# Patient Record
Sex: MALE | Race: WHITE | URBAN - METROPOLITAN AREA
[De-identification: names, ages, dates, MRNs, and addresses within clinical notes are randomized per-mention and may not be internally consistent; named-entity substitution may affect disease eponyms.]

---

## 2017-08-23 ENCOUNTER — APPOINTMENT (OUTPATIENT)
Dept: CT IMAGING | Facility: CLINIC | Age: 57
End: 2017-08-23
Attending: EMERGENCY MEDICINE
Payer: COMMERCIAL

## 2017-08-23 ENCOUNTER — HOSPITAL ENCOUNTER (INPATIENT)
Facility: CLINIC | Age: 57
LOS: 2 days | Discharge: HOME | End: 2017-08-25
Attending: EMERGENCY MEDICINE | Admitting: INTERNAL MEDICINE
Payer: COMMERCIAL

## 2017-08-23 DIAGNOSIS — R57.1 HYPOVOLEMIC SHOCK (H): ICD-10-CM

## 2017-08-23 DIAGNOSIS — N17.9 ACUTE KIDNEY INJURY (H): ICD-10-CM

## 2017-08-23 DIAGNOSIS — R19.7 DIARRHEA, UNSPECIFIED TYPE: ICD-10-CM

## 2017-08-23 LAB
ALBUMIN SERPL-MCNC: 3.8 G/DL (ref 3.4–5)
ALBUMIN UR-MCNC: 100 MG/DL
ALP SERPL-CCNC: 98 U/L (ref 40–150)
ALT SERPL W P-5'-P-CCNC: 26 U/L (ref 0–70)
ANION GAP SERPL CALCULATED.3IONS-SCNC: 13 MMOL/L (ref 3–14)
ANION GAP SERPL CALCULATED.3IONS-SCNC: 15 MMOL/L (ref 6–17)
APPEARANCE UR: ABNORMAL
AST SERPL W P-5'-P-CCNC: 12 U/L (ref 0–45)
BACTERIA #/AREA URNS HPF: ABNORMAL /HPF
BASE DEFICIT BLDV-SCNC: 7.4 MMOL/L
BASOPHILS # BLD AUTO: 0 10E9/L (ref 0–0.2)
BASOPHILS NFR BLD AUTO: 0.2 %
BILIRUB SERPL-MCNC: 0.5 MG/DL (ref 0.2–1.3)
BILIRUB UR QL STRIP: NEGATIVE
BUN SERPL-MCNC: 49 MG/DL (ref 7–30)
BUN SERPL-MCNC: 49 MG/DL (ref 7–30)
CA-I BLD-SCNC: 4.5 MG/DL (ref 4.4–5.2)
CALCIUM SERPL-MCNC: 9 MG/DL (ref 8.5–10.1)
CHLORIDE BLD-SCNC: 106 MMOL/L (ref 94–109)
CHLORIDE SERPL-SCNC: 105 MMOL/L (ref 94–109)
CK SERPL-CCNC: 420 U/L (ref 30–300)
CK SERPL-CCNC: 629 U/L (ref 30–300)
CO2 BLD-SCNC: 17 MMOL/L (ref 20–32)
CO2 SERPL-SCNC: 19 MMOL/L (ref 20–32)
COLOR UR AUTO: ABNORMAL
CREAT BLD-MCNC: 4.9 MG/DL (ref 0.66–1.25)
CREAT SERPL-MCNC: 4.77 MG/DL (ref 0.66–1.25)
DIFFERENTIAL METHOD BLD: ABNORMAL
EOSINOPHIL # BLD AUTO: 0 10E9/L (ref 0–0.7)
EOSINOPHIL NFR BLD AUTO: 0 %
ERYTHROCYTE [DISTWIDTH] IN BLOOD BY AUTOMATED COUNT: 14.1 % (ref 10–15)
GFR SERPL CREATININE-BSD FRML MDRD: 12 ML/MIN/1.7M2
GFR SERPL CREATININE-BSD FRML MDRD: 13 ML/MIN/1.7M2
GLUCOSE BLD-MCNC: 188 MG/DL (ref 70–99)
GLUCOSE SERPL-MCNC: 188 MG/DL (ref 70–99)
GLUCOSE UR STRIP-MCNC: 50 MG/DL
HCO3 BLDV-SCNC: 18 MMOL/L (ref 21–28)
HCT VFR BLD AUTO: 51.4 % (ref 40–53)
HCT VFR BLD CALC: 57 %PCV (ref 40–53)
HGB BLD CALC-MCNC: 19.4 G/DL (ref 13.3–17.7)
HGB BLD-MCNC: 16.3 G/DL (ref 13.3–17.7)
HGB UR QL STRIP: ABNORMAL
IMM GRANULOCYTES # BLD: 0.1 10E9/L (ref 0–0.4)
IMM GRANULOCYTES NFR BLD: 0.5 %
KETONES UR STRIP-MCNC: NEGATIVE MG/DL
LACTATE BLD-SCNC: 2.3 MMOL/L (ref 0.7–2)
LEUKOCYTE ESTERASE UR QL STRIP: NEGATIVE
LYMPHOCYTES # BLD AUTO: 0.9 10E9/L (ref 0.8–5.3)
LYMPHOCYTES NFR BLD AUTO: 5.9 %
MAGNESIUM SERPL-MCNC: 2 MG/DL (ref 1.6–2.3)
MCH RBC QN AUTO: 28.2 PG (ref 26.5–33)
MCHC RBC AUTO-ENTMCNC: 31.7 G/DL (ref 31.5–36.5)
MCV RBC AUTO: 89 FL (ref 78–100)
MONOCYTES # BLD AUTO: 1.4 10E9/L (ref 0–1.3)
MONOCYTES NFR BLD AUTO: 9.3 %
MUCOUS THREADS #/AREA URNS LPF: PRESENT /LPF
NEUTROPHILS # BLD AUTO: 12.5 10E9/L (ref 1.6–8.3)
NEUTROPHILS NFR BLD AUTO: 84.1 %
NITRATE UR QL: NEGATIVE
NRBC # BLD AUTO: 0 10*3/UL
NRBC BLD AUTO-RTO: 0 /100
O2/TOTAL GAS SETTING VFR VENT: ABNORMAL %
OXYHGB MFR BLDV: 37 %
PCO2 BLDV: 37 MM HG (ref 40–50)
PH BLDV: 7.3 PH (ref 7.32–7.43)
PH UR STRIP: 5 PH (ref 5–7)
PLATELET # BLD AUTO: 297 10E9/L (ref 150–450)
PLATELET # BLD AUTO: 394 10E9/L (ref 150–450)
PO2 BLDV: 21 MM HG (ref 25–47)
POTASSIUM BLD-SCNC: 4.3 MMOL/L (ref 3.4–5.3)
POTASSIUM SERPL-SCNC: 4.4 MMOL/L (ref 3.4–5.3)
PROT SERPL-MCNC: 8.5 G/DL (ref 6.8–8.8)
RBC # BLD AUTO: 5.78 10E12/L (ref 4.4–5.9)
RBC #/AREA URNS AUTO: 2 /HPF (ref 0–2)
SODIUM BLD-SCNC: 138 MMOL/L (ref 133–144)
SODIUM SERPL-SCNC: 137 MMOL/L (ref 133–144)
SOURCE: ABNORMAL
SP GR UR STRIP: 1.02 (ref 1–1.03)
SQUAMOUS #/AREA URNS AUTO: 2 /HPF (ref 0–1)
TROPONIN I SERPL-MCNC: <0.015 UG/L (ref 0–0.04)
UROBILINOGEN UR STRIP-MCNC: 0 MG/DL (ref 0–2)
WBC # BLD AUTO: 14.8 10E9/L (ref 4–11)
WBC #/AREA URNS AUTO: 8 /HPF (ref 0–2)

## 2017-08-23 PROCEDURE — 99285 EMERGENCY DEPT VISIT HI MDM: CPT | Mod: 25

## 2017-08-23 PROCEDURE — 85025 COMPLETE CBC W/AUTO DIFF WBC: CPT | Performed by: EMERGENCY MEDICINE

## 2017-08-23 PROCEDURE — 84484 ASSAY OF TROPONIN QUANT: CPT | Performed by: EMERGENCY MEDICINE

## 2017-08-23 PROCEDURE — 74176 CT ABD & PELVIS W/O CONTRAST: CPT

## 2017-08-23 PROCEDURE — 25000128 H RX IP 250 OP 636: Performed by: EMERGENCY MEDICINE

## 2017-08-23 PROCEDURE — 83605 ASSAY OF LACTIC ACID: CPT | Performed by: EMERGENCY MEDICINE

## 2017-08-23 PROCEDURE — 93005 ELECTROCARDIOGRAM TRACING: CPT

## 2017-08-23 PROCEDURE — 80047 BASIC METABLC PNL IONIZED CA: CPT

## 2017-08-23 PROCEDURE — 83735 ASSAY OF MAGNESIUM: CPT | Performed by: EMERGENCY MEDICINE

## 2017-08-23 PROCEDURE — 81001 URINALYSIS AUTO W/SCOPE: CPT | Performed by: EMERGENCY MEDICINE

## 2017-08-23 PROCEDURE — 82550 ASSAY OF CK (CPK): CPT | Performed by: EMERGENCY MEDICINE

## 2017-08-23 PROCEDURE — 80053 COMPREHEN METABOLIC PANEL: CPT | Performed by: EMERGENCY MEDICINE

## 2017-08-23 PROCEDURE — 87506 IADNA-DNA/RNA PROBE TQ 6-11: CPT | Performed by: EMERGENCY MEDICINE

## 2017-08-23 PROCEDURE — 85014 HEMATOCRIT: CPT

## 2017-08-23 PROCEDURE — 99223 1ST HOSP IP/OBS HIGH 75: CPT | Mod: AI | Performed by: INTERNAL MEDICINE

## 2017-08-23 PROCEDURE — 82550 ASSAY OF CK (CPK): CPT | Performed by: INTERNAL MEDICINE

## 2017-08-23 PROCEDURE — 25000128 H RX IP 250 OP 636: Performed by: INTERNAL MEDICINE

## 2017-08-23 PROCEDURE — 36415 COLL VENOUS BLD VENIPUNCTURE: CPT | Performed by: INTERNAL MEDICINE

## 2017-08-23 PROCEDURE — 87493 C DIFF AMPLIFIED PROBE: CPT | Performed by: EMERGENCY MEDICINE

## 2017-08-23 PROCEDURE — 85049 AUTOMATED PLATELET COUNT: CPT | Performed by: INTERNAL MEDICINE

## 2017-08-23 PROCEDURE — 12000000 ZZH R&B MED SURG/OB

## 2017-08-23 PROCEDURE — 82805 BLOOD GASES W/O2 SATURATION: CPT | Performed by: EMERGENCY MEDICINE

## 2017-08-23 RX ORDER — SODIUM CHLORIDE 9 MG/ML
INJECTION, SOLUTION INTRAVENOUS CONTINUOUS
Status: DISCONTINUED | OUTPATIENT
Start: 2017-08-23 | End: 2017-08-25

## 2017-08-23 RX ORDER — HEPARIN SODIUM 5000 [USP'U]/.5ML
5000 INJECTION, SOLUTION INTRAVENOUS; SUBCUTANEOUS EVERY 12 HOURS
Status: DISCONTINUED | OUTPATIENT
Start: 2017-08-23 | End: 2017-08-25 | Stop reason: HOSPADM

## 2017-08-23 RX ORDER — BISACODYL 10 MG
10 SUPPOSITORY, RECTAL RECTAL DAILY PRN
Status: DISCONTINUED | OUTPATIENT
Start: 2017-08-23 | End: 2017-08-23

## 2017-08-23 RX ORDER — PROCHLORPERAZINE 25 MG
25 SUPPOSITORY, RECTAL RECTAL EVERY 12 HOURS PRN
Status: DISCONTINUED | OUTPATIENT
Start: 2017-08-23 | End: 2017-08-25 | Stop reason: HOSPADM

## 2017-08-23 RX ORDER — SODIUM CHLORIDE, SODIUM LACTATE, POTASSIUM CHLORIDE, CALCIUM CHLORIDE 600; 310; 30; 20 MG/100ML; MG/100ML; MG/100ML; MG/100ML
INJECTION, SOLUTION INTRAVENOUS ONCE
Status: DISCONTINUED | OUTPATIENT
Start: 2017-08-23 | End: 2017-08-23

## 2017-08-23 RX ORDER — NICOTINE POLACRILEX 4 MG
15-30 LOZENGE BUCCAL
Status: DISCONTINUED | OUTPATIENT
Start: 2017-08-23 | End: 2017-08-25 | Stop reason: HOSPADM

## 2017-08-23 RX ORDER — BISACODYL 5 MG
5-15 TABLET, DELAYED RELEASE (ENTERIC COATED) ORAL DAILY PRN
Status: DISCONTINUED | OUTPATIENT
Start: 2017-08-23 | End: 2017-08-23

## 2017-08-23 RX ORDER — LISINOPRIL 20 MG/1
20 TABLET ORAL DAILY
Status: ON HOLD | COMMUNITY
End: 2017-08-25

## 2017-08-23 RX ORDER — ACETAMINOPHEN 325 MG/1
650 TABLET ORAL EVERY 4 HOURS PRN
Status: DISCONTINUED | OUTPATIENT
Start: 2017-08-23 | End: 2017-08-25 | Stop reason: HOSPADM

## 2017-08-23 RX ORDER — ONDANSETRON 2 MG/ML
4 INJECTION INTRAMUSCULAR; INTRAVENOUS EVERY 6 HOURS PRN
Status: DISCONTINUED | OUTPATIENT
Start: 2017-08-23 | End: 2017-08-25 | Stop reason: HOSPADM

## 2017-08-23 RX ORDER — PROCHLORPERAZINE MALEATE 5 MG
5-10 TABLET ORAL EVERY 6 HOURS PRN
Status: DISCONTINUED | OUTPATIENT
Start: 2017-08-23 | End: 2017-08-25 | Stop reason: HOSPADM

## 2017-08-23 RX ORDER — DEXTROSE MONOHYDRATE 25 G/50ML
25-50 INJECTION, SOLUTION INTRAVENOUS
Status: DISCONTINUED | OUTPATIENT
Start: 2017-08-23 | End: 2017-08-25 | Stop reason: HOSPADM

## 2017-08-23 RX ORDER — LIDOCAINE 40 MG/G
CREAM TOPICAL
Status: DISCONTINUED | OUTPATIENT
Start: 2017-08-23 | End: 2017-08-25 | Stop reason: HOSPADM

## 2017-08-23 RX ORDER — AMOXICILLIN 250 MG
1-2 CAPSULE ORAL 2 TIMES DAILY
Status: DISCONTINUED | OUTPATIENT
Start: 2017-08-23 | End: 2017-08-23

## 2017-08-23 RX ORDER — ONDANSETRON 4 MG/1
4 TABLET, ORALLY DISINTEGRATING ORAL EVERY 6 HOURS PRN
Status: DISCONTINUED | OUTPATIENT
Start: 2017-08-23 | End: 2017-08-25 | Stop reason: HOSPADM

## 2017-08-23 RX ORDER — NALOXONE HYDROCHLORIDE 0.4 MG/ML
.1-.4 INJECTION, SOLUTION INTRAMUSCULAR; INTRAVENOUS; SUBCUTANEOUS
Status: DISCONTINUED | OUTPATIENT
Start: 2017-08-23 | End: 2017-08-25 | Stop reason: HOSPADM

## 2017-08-23 RX ADMIN — SODIUM CHLORIDE, PRESERVATIVE FREE: 5 INJECTION INTRAVENOUS at 21:25

## 2017-08-23 RX ADMIN — SODIUM CHLORIDE, PRESERVATIVE FREE: 5 INJECTION INTRAVENOUS at 21:41

## 2017-08-23 RX ADMIN — SODIUM CHLORIDE 1000 ML: 9 INJECTION, SOLUTION INTRAVENOUS at 17:40

## 2017-08-23 RX ADMIN — SODIUM CHLORIDE, POTASSIUM CHLORIDE, SODIUM LACTATE AND CALCIUM CHLORIDE 2000 ML: 600; 310; 30; 20 INJECTION, SOLUTION INTRAVENOUS at 17:54

## 2017-08-23 RX ADMIN — HEPARIN SODIUM 5000 UNITS: 10000 INJECTION, SOLUTION INTRAVENOUS; SUBCUTANEOUS at 21:48

## 2017-08-23 ASSESSMENT — ENCOUNTER SYMPTOMS
NAUSEA: 0
DIZZINESS: 1
DIARRHEA: 1
BLOOD IN STOOL: 0

## 2017-08-23 NOTE — ED NOTES
"Diarrhea , weakness and intermittent vomiting for the past 3 days. Today feeling weak and dizzy. \"I think I ate some bad watermelon on Sunday\"   Airway, breathing, and circulatiion intact.   "

## 2017-08-23 NOTE — IP AVS SNAPSHOT
[facility]    [address] [facility] [address]    Phone:  [phone]    Fax:  [phone]                                       After Visit Summary   8/23/2017    Harinder Rosario    MRN: [id number]           After Visit Summary Signature Page     I have received my discharge instructions, and my questions have been answered. I have discussed any challenges I see with this plan with the nurse or doctor.    ..........................................................................................................................................  Patient/Patient Representative Signature      ..........................................................................................................................................  Patient Representative Print Name and Relationship to Patient    ..................................................               ................................................  Date                                            Time    ..........................................................................................................................................  Reviewed by Signature/Title    ...................................................              ..............................................  Date                                                            Time

## 2017-08-23 NOTE — IP AVS SNAPSHOT
MRN:[id number]                      After Visit Summary   8/23/2017    Harinder Rosario    MRN: [id number]           Thank you!     Thank you for choosing [facility] for your care. Our goal is always to provide you with excellent care. Hearing back from our patients is one way we can continue to improve our services. Please take a few minutes to complete the written survey that you may receive in the mail after you visit. If you would like to speak to someone directly about your visit please contact Patient Relations at [phone]. Thank you!          Patient Information     Date Of Birth          1960        Designated Caregiver       Most Recent Value    Caregiver    Will someone help with your care after discharge? yes    Name of designated caregiver Dorita Rosario    Phone number of caregiver [phone]      About your hospital stay     You were admitted on:  August 23, 2017 You last received care in the:  [facility]    You were discharged on:  August 25, 2017        Reason for your hospital stay       Severe gastroenteritis resulting in acute renal failure                  Who to Call     For medical emergencies, please call 911.  For non-urgent questions about your medical care, please call your primary care provider or clinic, None          Attending Provider     Provider Specialty    Thomas Freitas MD Emergency Medicine    Asmita Deshpande MD Internal Medicine       Primary Care Provider    Physician No Ref-Primary      After Care Instructions     Activity       Your activity upon discharge: activity as tolerated            Diet       Follow this diet upon discharge: Orders Placed This Encounter      Combination Diet Regular Diet Adult; Renal Diet                  Follow-up Appointments     Follow-up and recommended labs and tests        Follow-up with primary care on Monday with a repeat basic metabolic panel and discuss antihypertensive (need  "for an alternative or resuming lisinopril if kidney function allows)                  Your next 10 appointments already scheduled     Aug 28, 2017  3:40 PM CDT   Office Visit with Lorin Ohara MD   [facility] [place] [facility]    [address]  [address]  [place] [id number] [phone]           Bring a current list of meds and any records pertaining to this visit. For Physicals, please bring immunization records and any forms needing to be filled out. Please arrive 10 minutes early to complete paperwork.              Further instructions from your care team       An appointment has been scheduled for you to establish care and f/u with post hospital.  Please arrive 15 min prior to appointment with insurance information.    Dr. Lorin Ohara  [address] [place] [address]  Contact  Appointments: [phone]   Information: [phone]   Fax: [phone]     Appointment scheduled for   8/28/17 at 3:40pm    Pending Results     No orders found from 8/21/2017 to 8/24/2017.            Statement of Approval     Ordered          08/25/17 1038  I have reviewed and agree with all the recommendations and orders detailed in this document.  EFFECTIVE NOW     Approved and electronically signed by:  Cory Lawrence MD             Admission Information     Date & Time Provider Department Dept. Phone    8/23/2017 Asmita Deshpande MD [facility] [phone]      Your Vitals Were     Blood Pressure Pulse Temperature Respirations Height Weight    101/55 89 97  F (36.1  C) 16 1.829 m (6') 146.6 kg (323 lb 1.6 oz)    Pulse Oximetry BMI (Body Mass Index)                97% 43.82 kg/m2          MyChart Information     [employer] lets you send messages to your doctor, view your test results, renew your prescriptions, schedule appointments and more. To sign up, go to [email or web address] . Click on \"Log in\" on the " "left side of the screen, which will take you to the Welcome page. Then click on \"Sign up Now\" on the right side of the page.     You will be asked to enter the access code listed below, as well as some personal information. Please follow the directions to create your username and password.     Your access code is: [email or web address]  Expires: 2017  7:48 PM     Your access code will  in 90 days. If you need help or a new code, please call your [place] clinic or [phone].        Care EveryWhere ID     This is your Care EveryWhere ID. This could be used by other organizations to access your [place] medical records  [id number]        Equal Access to Services     JORDAN MCALLISTER : Seb Pearce, rose mary barbosa, qakrunal kaalmamayda dowling, arabella gutiérrez. So [facility] [phone].    ATENCIÓN: Si habla español, tiene a marshall disposición servicios gratuitos de asistencia lingüística. Llame al [phone].    We comply with applicable federal civil rights laws and [place] laws. We do not discriminate on the basis of race, color, national origin, age, disability sex, sexual orientation or gender identity.               Review of your medicines      STOP taking     lisinopril 20 MG tablet   Commonly known as:  PRINIVIL/ZESTRIL                    Protect others around you: Learn how to safely use, store and throw away your medicines at www.disposemymeds.org.             Medication List: This is a list of all your medications and when to take them. Check marks below indicate your daily home schedule. Keep this list as a reference.      Notice     You have not been prescribed any medications.      "

## 2017-08-23 NOTE — ED PROVIDER NOTES
History     Chief Complaint:  Dizziness      HPI   Harinder Rosario is a 56 year old male with a history of HTN who presents with dizziness. The patient reports 2 days ago he developed diarrhea which has been worsening. The patient reports approximately 15 episodes of non bloody diarrhea daily since onset. Today the patient had an episode of vomiting as well. Earlier today the patient developed dizziness after standing up and fell although no head trauma or loss of consciousness. The patient continues to feel dizzy which prompts his visit. The dizziness has been constant although worsened with sitting or standing. He has felt some numbness/tingling in his finger tips today and some cramping. No fevers. No chest pain or any associated symptoms. No recent hospitalizations. Blood pressures are typically in the 120s systolically with lisinopril. Currently no nausea.     Allergies:  The patient has no known allergies to medications.        Medications:    Lisinopril    Past Medical History:    HTN    Past Surgical History:    The patient has no pertinent surgical history.    Family History:    The patient has no pertinent family history.     Social History:  The patient denies smoking.   The patient consumes alcohol.      Review of Systems   Gastrointestinal: Positive for diarrhea. Negative for blood in stool and nausea.   Neurological: Positive for dizziness. Negative for syncope.   All other systems reviewed and are negative.      Physical Exam     Patient Vitals for the past 24 hrs:   BP Temp Temp src Heart Rate Resp SpO2 Height Weight   08/23/17 1850 108/69 - - 82 - 98 % - -   08/23/17 1840 108/64 - - 82 - 99 % - -   08/23/17 1830 95/66 - - 86 - 99 % - -   08/23/17 1829 - - - 84 - 100 % - -   08/23/17 1820 105/63 - - 79 - 100 % - -   08/23/17 1815 - - - 77 - 100 % - -   08/23/17 1810 111/56 - - 80 - 100 % - -   08/23/17 1800 91/56 - - 80 - 99 % - -   08/23/17 1750 109/64 - - 80 - 98 % - -   08/23/17 1745 - - - 89  - 97 % - -   08/23/17 1744 (!) 80/55 - - 92 - - - -   08/23/17 1730 (!) 74/39 - - - - - - -   08/23/17 1721 (!) 84/46 97  F (36.1  C) Temporal 126 24 97 % 1.829 m (6') (!) 145.2 kg (320 lb)        Physical Exam    General:   Pleasant, age appropriate ill appearing male.  HEENT:    Oropharynx is dry, without lesions or trismus.  Eyes:    Conjunctiva normal  Neck:    Supple, no meningismus.     CV:     Tachycardic, regular rhythm.      No murmurs, rubs or gallops.       No JVD or unilateral leg swelling.       2+ radial pulses bilateral.       No lower extremity edema.  PULM:    Clear to auscultation bilateral.       No respiratory distress.      Good air exchange.     No rales or wheezing.     No stridor.  ABD:    Soft, non-distended.       Mild diffuse tenderness.     No CVA tenderness.     No pulsatile masses.       No rebound, guarding or rigidity.  MSK:     No gross deformity to all four extremities.   LYMPH:   No cervical lymphadenopathy.  NEURO:   Alert. Good muscle tone, no atrophy.  Skin:    Cool, clammy and intact.    Psych:    Mood is good and affect is appropriate.        Emergency Department Course   ECG:  Completed at 1738.  Read at 1740.   Rate 96 bpm. SD interval 120. QRS duration 90. QT/QTc 350/442. P-R-T axes 66 -1 64. normal sinus rhythm, normal ECG   Imaging:  CT Abd/Pelvis no contrast stone protcol:  In process    Laboratory:  ISTAT Basic Met ICa Hct:  (H), BUN 49 (H), Creat 4.9 (H), GFR 12 (L), HGB 19.4 (H), HCT 57 (H), [place] CO2 17 (L), ow wnl    CBC: WBC 14.8 (H), ow wnl (HGB 16.3, )  CMP:  (H), BUN 49 (H), Creat 4.77 (H), GFR 13 (L), ow wnl  Magnesium: 2.0  ISTAT BVGL: pH 7.30 (L), PCO2 37 (L), PO2 21 (L), Bicarb 18 (L), Base deficit 7.4, Oxyhem 37.   1747 Troponin: <0.015   Lactic acid: 2.3 (H)     C diff toxin B PCR: in process  Enteric bacteria and virus panel by KEVIN stool: in process    UA with micro: in process    Interventions:  Lactated ringers 2000 mL  0.9% sodium  chloride infusion 1000 mL    Emergency Department Course:  Nursing notes and vitals reviewed.  I performed an exam of the patient as documented above.     The work up above was undertaken.     The patient received the intervention(s) above.     Findings and plan explained to the Patient who consents to admission. Discussed the patient with the hospitalist service, who will admit the patient for further monitoring, evaluation, and treatment.     Impression & Plan    Medical Decision Making:  Harinder Rosario is a 56 year old male presenting to the emergency department with voluminous diarrhea and developing dizziness described as light headedness. The patient was markedly hypotensive, 70s/30s, on presentation. He was in obvious shock secondary to hypovolemia. He was given aggressive IV fluids for which blood pressures have now normalized. He has mild acidosis with associated acute kidney injury with creatinine at 4.9. He was evaluated for post obstructive kidney injury. Bladder scan is unremarkable. Non contrast CT ruled obstructive renal pathology and has findings to suggest the clinical diagnosis of gastroenteritis. Work up for cause of diarrhea will include C diff toxin and stool culture for bacterial enteritis although I feel this is less likely. The patient will be admitted to the floor for further acute care and treatment of hypovolemic shock and resulting acute kidney injury related to diarrhea and volume depletion.     Diagnosis:    ICD-10-CM   1. Acute kidney injury (H) N17.9   2. Hypovolemic shock (H) R57.1   3. Diarrhea, unspecified type R19.7       Disposition:  Admitted.    ISteve, am serving as a scribe at 7:10 PM on 8/23/2017 to document services personally performed by Dr. Freitas, based on my observations and the provider's statements to me.    [facility] EMERGENCY DEPARTMENT       Thomas Freitas MD  08/23/17 2004

## 2017-08-24 LAB
ANION GAP SERPL CALCULATED.3IONS-SCNC: 10 MMOL/L (ref 3–14)
BASOPHILS # BLD AUTO: 0 10E9/L (ref 0–0.2)
BASOPHILS NFR BLD AUTO: 0.2 %
BUN SERPL-MCNC: 57 MG/DL (ref 7–30)
C COLI+JEJUNI+LARI FUSA STL QL NAA+PROBE: NOT DETECTED
C DIFF TOX B STL QL: NEGATIVE
CALCIUM SERPL-MCNC: 9.1 MG/DL (ref 8.5–10.1)
CHLORIDE SERPL-SCNC: 105 MMOL/L (ref 94–109)
CO2 SERPL-SCNC: 20 MMOL/L (ref 20–32)
CREAT SERPL-MCNC: 3.38 MG/DL (ref 0.66–1.25)
CREAT UR-MCNC: 368 MG/DL
DIFFERENTIAL METHOD BLD: ABNORMAL
EC STX1 GENE STL QL NAA+PROBE: NOT DETECTED
EC STX2 GENE STL QL NAA+PROBE: NOT DETECTED
ENTERIC PATHOGEN COMMENT: NORMAL
EOSINOPHIL # BLD AUTO: 0.2 10E9/L (ref 0–0.7)
EOSINOPHIL NFR BLD AUTO: 1.8 %
ERYTHROCYTE [DISTWIDTH] IN BLOOD BY AUTOMATED COUNT: 14.2 % (ref 10–15)
FRACT EXCRET NA UR+SERPL-RTO: 0.1 %
GFR SERPL CREATININE-BSD FRML MDRD: 19 ML/MIN/1.7M2
GLUCOSE BLDC GLUCOMTR-MCNC: 107 MG/DL (ref 70–99)
GLUCOSE BLDC GLUCOMTR-MCNC: 115 MG/DL (ref 70–99)
GLUCOSE SERPL-MCNC: 124 MG/DL (ref 70–99)
HBA1C MFR BLD: 6.1 % (ref 4.3–6)
HCT VFR BLD AUTO: 44.3 % (ref 40–53)
HGB BLD-MCNC: 14.2 G/DL (ref 13.3–17.7)
IMM GRANULOCYTES # BLD: 0.1 10E9/L (ref 0–0.4)
IMM GRANULOCYTES NFR BLD: 0.4 %
INTERPRETATION ECG - MUSE: NORMAL
LYMPHOCYTES # BLD AUTO: 1.5 10E9/L (ref 0.8–5.3)
LYMPHOCYTES NFR BLD AUTO: 12.4 %
MCH RBC QN AUTO: 28.3 PG (ref 26.5–33)
MCHC RBC AUTO-ENTMCNC: 32.1 G/DL (ref 31.5–36.5)
MCV RBC AUTO: 88 FL (ref 78–100)
MONOCYTES # BLD AUTO: 1.4 10E9/L (ref 0–1.3)
MONOCYTES NFR BLD AUTO: 11.2 %
NEUTROPHILS # BLD AUTO: 9 10E9/L (ref 1.6–8.3)
NEUTROPHILS NFR BLD AUTO: 74 %
NOROV GI+II ORF1-ORF2 JNC STL QL NAA+PR: NOT DETECTED
NRBC # BLD AUTO: 0 10*3/UL
NRBC BLD AUTO-RTO: 0 /100
OSMOLALITY UR: 462 MMOL/KG (ref 100–1200)
PLATELET # BLD AUTO: 283 10E9/L (ref 150–450)
POTASSIUM SERPL-SCNC: 4 MMOL/L (ref 3.4–5.3)
RBC # BLD AUTO: 5.02 10E12/L (ref 4.4–5.9)
RVA NSP5 STL QL NAA+PROBE: NOT DETECTED
SALMONELLA SP RPOD STL QL NAA+PROBE: NOT DETECTED
SHIGELLA SP+EIEC IPAH STL QL NAA+PROBE: NOT DETECTED
SODIUM SERPL-SCNC: 135 MMOL/L (ref 133–144)
SODIUM UR-SCNC: 17 MMOL/L
SPECIMEN SOURCE: NORMAL
V CHOL+PARA RFBL+TRKH+TNAA STL QL NAA+PR: NOT DETECTED
WBC # BLD AUTO: 12.1 10E9/L (ref 4–11)
Y ENTERO RECN STL QL NAA+PROBE: NOT DETECTED

## 2017-08-24 PROCEDURE — 83935 ASSAY OF URINE OSMOLALITY: CPT | Performed by: INTERNAL MEDICINE

## 2017-08-24 PROCEDURE — 25000128 H RX IP 250 OP 636: Performed by: INTERNAL MEDICINE

## 2017-08-24 PROCEDURE — 99232 SBSQ HOSP IP/OBS MODERATE 35: CPT | Performed by: INTERNAL MEDICINE

## 2017-08-24 PROCEDURE — 84300 ASSAY OF URINE SODIUM: CPT | Performed by: INTERNAL MEDICINE

## 2017-08-24 PROCEDURE — 12000007 ZZH R&B INTERMEDIATE

## 2017-08-24 PROCEDURE — 80048 BASIC METABOLIC PNL TOTAL CA: CPT | Performed by: INTERNAL MEDICINE

## 2017-08-24 PROCEDURE — 00000146 ZZHCL STATISTIC GLUCOSE BY METER IP

## 2017-08-24 PROCEDURE — 99207 ZZC CDG-MDM COMPONENT: MEETS LOW - DOWN CODED: CPT | Performed by: INTERNAL MEDICINE

## 2017-08-24 PROCEDURE — 36415 COLL VENOUS BLD VENIPUNCTURE: CPT | Performed by: INTERNAL MEDICINE

## 2017-08-24 PROCEDURE — 85025 COMPLETE CBC W/AUTO DIFF WBC: CPT | Performed by: INTERNAL MEDICINE

## 2017-08-24 PROCEDURE — 25000132 ZZH RX MED GY IP 250 OP 250 PS 637: Performed by: INTERNAL MEDICINE

## 2017-08-24 PROCEDURE — 83036 HEMOGLOBIN GLYCOSYLATED A1C: CPT | Performed by: INTERNAL MEDICINE

## 2017-08-24 PROCEDURE — 82570 ASSAY OF URINE CREATININE: CPT | Performed by: INTERNAL MEDICINE

## 2017-08-24 RX ORDER — CALCIUM CARBONATE 500 MG/1
500-1000 TABLET, CHEWABLE ORAL
Status: DISCONTINUED | OUTPATIENT
Start: 2017-08-24 | End: 2017-08-25 | Stop reason: HOSPADM

## 2017-08-24 RX ADMIN — CALCIUM CARBONATE (ANTACID) CHEW TAB 500 MG 1000 MG: 500 CHEW TAB at 18:29

## 2017-08-24 RX ADMIN — HEPARIN SODIUM 5000 UNITS: 10000 INJECTION, SOLUTION INTRAVENOUS; SUBCUTANEOUS at 20:25

## 2017-08-24 RX ADMIN — SODIUM CHLORIDE, PRESERVATIVE FREE: 5 INJECTION INTRAVENOUS at 01:48

## 2017-08-24 RX ADMIN — HEPARIN SODIUM 5000 UNITS: 10000 INJECTION, SOLUTION INTRAVENOUS; SUBCUTANEOUS at 09:34

## 2017-08-24 RX ADMIN — CALCIUM CARBONATE (ANTACID) CHEW TAB 500 MG 1000 MG: 500 CHEW TAB at 07:03

## 2017-08-24 RX ADMIN — CALCIUM CARBONATE (ANTACID) CHEW TAB 500 MG 1000 MG: 500 CHEW TAB at 01:48

## 2017-08-24 RX ADMIN — SODIUM CHLORIDE, PRESERVATIVE FREE: 5 INJECTION INTRAVENOUS at 09:41

## 2017-08-24 RX ADMIN — SODIUM CHLORIDE, PRESERVATIVE FREE: 5 INJECTION INTRAVENOUS at 18:00

## 2017-08-24 NOTE — PLAN OF CARE
Problem: Goal Outcome Summary  Goal: Goal Outcome Summary  Outcome: Improving  A/O. Up SBA. Voiding. Diarrhea is slowing. Denies pain. Tolerating diet. Blood sugars controled. Heparin.

## 2017-08-24 NOTE — H&P
"History and Physical     Harinder Rosario MRN# [id number]   YOB: 1960 Age: 56 year old      Date of Admission:  8/23/2017    Primary care provider: No Ref-Primary, Physician          Assessment and Plan:   Mr Rosario is a 56 y o m with pmh of htn on ACE I therapy who presents with profuse diarrhea and e/o BAMBI, but suspected prior undiagnosed CKD, and hyperglycemia    1.  BAMBI:  I suspect this will turn out to be BAMBI on CKI-BUN/creat 49/4.9-I am unable to locate a bmp since 2013, but he appears to f/u for his bp and his MD orders appropriate f/u labs but I cannot locate them. He reports \"routine\" blood test within the past year through his work but he does not know exactly what was tested, his dtr will contact them to try and find those results. He's rec'd 3 L in the ER, and there was no e/o retention by bladder scanning in the ER (PVR 40 cc).  Cont aggressive IVF in hopes of \"flushing\" the kidneys to get them to open up, if return of function stagnates, consider US to eval for size/signs of CKD.  Check FeNa. Avoid nephrotoxins, d/c ACE I.  He also has mod blood with only 2 rbc's so checked CK and only slightly up at 420    2.  Profuse diarrhea:  Enteric panel and c diff collected, no empiric abx treatment.  NAGMA probably related to diarrhea    3.  Hyperglycemia:  Watch of ISS for 24 hours if no significant needs would d/c monitoring, check hgb a1c in case has brewing dm which could be contributing to #1     2.  Htn:  Home regimen is lisinopril: clearly will need to be held in light of #1    PPX:  Sq heparin  Code:  Full  Dispo:  Admit IP               Chief Complaint:   Profuse diarrhea/pre-syncope       History of Present Illness:   This patient is a 56 year old male w/ PMH notable for hypertension who presents with 2 day hx of profuse diarrhea.  Described as brown and watery, non bloody, some mild crampy abdominal pain, did have some nausea with vomiting earlier today.  No fever.  No one else sick in " house.  No recent hospitalizations or abx for anything.  dtr works in [place] but reports no recent cases of C diff.  He thinks it may have been a watermelon he ate the night before his sx began but his daughter ate the same watermelon without developing diarrhea.     He has htn and is on chronic lisinopril at 20 mg per day, he routinely follows up but I do not see that he has had a bmp since 2013 (looks to be ordered just not completed)-he reports having some blood work done at his place of employment perhaps as recently as the past year but does not recall the results or the specific tests.     In ER :  He was hypotensive on presentation, subsequently resolved with IVF.  AF and other VSS.  Labs notable for BUN/creat 49/4.9, normal K       The history is obtained in discussion with the ER provider Dr. 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 Auth Provider   lisinopril (PRINIVIL/ZESTRIL) 20 MG tablet Take 20 mg by mouth daily 8/23/2017 at am Yes Unknown, Entered By History                 Review of Systems:   A Comprehensive greater than 10 system review of systems was carried out.  Pertinent positives and negatives are noted above.  Otherwise negative for contributory  information.           Physical Exam:   Blood pressure 108/69, temperature 97  F (36.1  C), temperature source Temporal, resp. rate 24, height 1.829 m (6'), weight (!) 145.2 kg (320 lb), SpO2 98 %.  Exam:  Pleasant nad looks stated age head nc/at sclera clear neck is supple lungs ctab nl effort RRR no m/r/g no le edema abdominal exam is obese but bs + (non hyperactive) s/nt/nd alert and oriented affect appropriate cn 2-12 grossly intact and campos. Neuro is non-focal          Data:   Lactic acid:  2.3        Lab Results   Component Value Date     08/23/2017    Lab Results   Component Value Date    CHLORIDE 106 08/23/2017    Lab Results   Component Value Date    BUN 49 08/23/2017      Lab Results   Component Value Date    POTASSIUM 4.3 08/23/2017    Lab Results   Component Value Date    CO2 19 08/23/2017    Lab Results   Component Value Date    CR 4.77 08/23/2017        Lab Results   Component Value Date    WBC 14.8 (H) 08/23/2017    HGB 19.4 (H) 08/23/2017    HCT 51.4 08/23/2017    MCV 89 08/23/2017     08/23/2017     Lab Results   Component Value Date     (H) 08/23/2017     Lab Results   Component Value Date    AST 12 08/23/2017    ALT 26 08/23/2017    ALKPHOS 98 08/23/2017    BILITOTAL 0.5 08/23/2017     Lab Results   Component Value Date    TROPI <0.015 08/23/2017              Imaging:     Recent Results (from the past 24 hour(s))   Abd/pelvis CT no contrast - Stone Protocol    Narrative    CT ABDOMEN AND PELVIS WITHOUT CONTRAST   8/23/2017 7:30 PM     HISTORY: Acute renal failure. Diarrhea.    COMPARISON: None.    TECHNIQUE: Intravenous contrast was not administered due to suboptimal  renal function. Without intravenous contrast, helical sections were  acquired from the top of the diaphragm through the pubic symphysis.  Coronal reconstructions were generated. Radiation dose for this scan  was reduced using automated exposure control, adjustment of the mA  and/or kV according to the patient's  size, or iterative reconstruction  technique.    FINDINGS:     Abdomen: The liver, spleen, pancreas, adrenal glands and kidneys are  unremarkable. The gallbladder is present. Fluid is present within the  gastric lumen. No enlarged lymph nodes or free fluid in the upper  abdomen. Atherosclerotic calcification in the abdominal aorta.    Scan through the lower chest is significant for a small calcified  granuloma in the left lower lobe.    Pelvis: Fluid is present throughout the lumens of the small and large  bowel. There is mild distention of the mid small bowel, but no  convincing significant decompression of the distal small bowel to  suggest the presence of a bowel obstruction. The appendix is  unremarkable. No visualized bowel wall thickening, pneumatosis or free  intraperitoneal gas. No enlarged lymph nodes or free fluid in the  pelvis.      Impression    IMPRESSION:   1. Fluid is present throughout the lumens of the stomach and small and  large bowel. This is nonspecific, but could relate to gastroenteritis.  2. No other cause of acute pain identified in the abdomen or pelvis.  The appendix is unremarkable.  3. No convincing evidence of bowel obstruction.    PRAVEEN SHANE MD

## 2017-08-24 NOTE — PROGRESS NOTES
[facility]    Hospitalist Progress Note  Name: Harinder Rosario    MRN: [id number]  Provider:  Chris Hendricks DO [facility] (Text Page)    Assessment & Plan   Summary of Stay: Harinder Rosario is a 56 year old male who was admitted on 8/23/2017 with nausea, loose stools, and ARF.    1.  Nausea/loose stools, suspect viral gastroenteritis:  -  Multiple negative stool studies including C. Diff.  Given history/CT appearance this may be viral gastroenteritis.  He is improving with declining stools and improved nausea/intake today.  Monitor for now, if worsens again or not continuing to improve I would ask GI to see him.  -  Anti-emetics PRN    2.  ARF superimposed on unclear baseline CKD:  -  Renal function markedly improved with IVF. I will continue them until at least tomorrow AM.  Hold on further major renal workup unless the function does not continue to improve.  -  Keep ACEI on hold, likely d/c him ultimately off this until renal function completely back to normal    3.   Hyperglycemia:  -  Better now, A1C borderline high.  Recommend outpatient recheck fasting glu when not ill.    4.  HTN:  -  BP ok off meds right now, monitor    DVT Prophylaxis: Pneumatic Compression Devices  Code Status: Full Code    Disposition Plan   Expected discharge in 1-2 days (more likely 2) to prior living arrangement .     Entered: Chris Hendricks 08/24/2017, 3:57 PM     Interval History   Assumed care for the day, history reviewed.  Feeling better but still loose stools (less than yesterday).  Appetite improved, denies emesis today.  No other acute complaints.    -Data reviewed today: I reviewed all new labs and imaging reports over the last 24 hours. I personally reviewed no images or EKG's today.    Physical Exam   Temp: 97.4  F (36.3  C) Temp src: Oral BP: 104/56 Pulse: 89 Heart Rate: 81 Resp: 16 SpO2: 96 % O2 Device: None (Room air)    Vitals:    08/23/17 1721 08/24/17 0655   Weight: (!) 145.2 kg (320 lb) (!) 146.3 kg (322  lb 8 oz)     Vital Signs with Ranges  Temp:  [96.4  F (35.8  C)-97.4  F (36.3  C)] 97.4  F (36.3  C)  Pulse:  [74-89] 89  Heart Rate:  [] 81  Resp:  [11-24] 16  BP: ()/(39-71) 104/56  SpO2:  [95 %-100 %] 96 %  I/O last 3 completed shifts:  In: 1406 [P.O.:500; I.V.:906]  Out: 1200 [Urine:1200]    GEN:  Alert, oriented x 3, appears comfortable, NAD.  HEENT:  Normocephalic/atraumatic, no scleral icterus, no nasal discharge, mouth moist.  CV:  Regular rate and rhythm, no murmur or JVD.  S1 + S2 noted, no S3 or S4.  LUNGS:  Clear to auscultation bilaterally without rales/rhonchi/wheezing/retractions.  Symmetric chest rise on inhalation noted.  ABD:  Active bowel sounds, soft, non-tender/non-distended.  No rebound/guarding/rigidity.  EXT:  No edema.  No cyanosis.  No acute joint synovitis noted.  SKIN:  Dry to touch, no exanthems noted in the visualized areas.    Medications     NaCl 125 mL/hr at 08/24/17 0941       sodium chloride (PF)  3 mL Intracatheter Q8H     heparin  5,000 Units Subcutaneous Q12H     Data       Recent Labs  Lab 08/24/17  0640 08/23/17  2230 08/23/17  1749 08/23/17  1747   WBC 12.1*  --   --  14.8*   HGB 14.2  --  19.4* 16.3   HCT 44.3  --   --  51.4   MCV 88  --   --  89    297  --  394       Recent Labs  Lab 08/24/17  0640 08/23/17  1749 08/23/17  1747    138 137   POTASSIUM 4.0 4.3 4.4   CHLORIDE 105 106 105   CO2 20  --  19*   ANIONGAP 10 15 13   * 188* 188*   BUN 57* 49* 49*   CR 3.38*  --  4.77*   GFRESTIMATED 19* 12* 13*   GFRESTBLACK 23* 15* 15*   LILIA 9.1  --  9.0   MAG  --   --  2.0   PROTTOTAL  --   --  8.5   ALBUMIN  --   --  3.8   BILITOTAL  --   --  0.5   ALKPHOS  --   --  98   AST  --   --  12   ALT  --   --  26     C. Diff negative   Component Value Flag Ref Range Units Status Collected Lab     Campylobacter group by KEVIN Not Detected  NDET^Not Detected  Final 08/23/2017  7:10 PM 75     Salmonella species by KEVIN Not Detected  NDET^Not Detected  Final  08/23/2017  7:10 PM 75     Shigella species by KEVIN Not Detected  NDET^Not Detected  Final 08/23/2017  7:10 PM 75     Vibrio group by KEVIN Not Detected  NDET^Not Detected  Final 08/23/2017  7:10 PM 75     Rotavirus A by KEVIN Not Detected  NDET^Not Detected  Final 08/23/2017  7:10 PM 75     Shiga toxin 1 gene by KEVIN Not Detected  NDET^Not Detected  Final 08/23/2017  7:10 PM 75     Shiga toxin 2 gene by KEVIN Not Detected  NDET^Not Detected  Final 08/23/2017  7:10 PM 75     Norovirus I and II by KEVIN Not Detected  NDET^Not Detected  Final 08/23/2017  7:10 PM 75     Yersinia enterocolitica by KEVIN Not Detected  NDET^Not Detected  Final 08/23/2017  7:10 PM 75         Recent Results (from the past 24 hour(s))   Abd/pelvis CT no contrast - Stone Protocol    Narrative    CT ABDOMEN AND PELVIS WITHOUT CONTRAST   8/23/2017 7:30 PM     HISTORY: Acute renal failure. Diarrhea.    COMPARISON: None.    TECHNIQUE: Intravenous contrast was not administered due to suboptimal  renal function. Without intravenous contrast, helical sections were  acquired from the top of the diaphragm through the pubic symphysis.  Coronal reconstructions were generated. Radiation dose for this scan  was reduced using automated exposure control, adjustment of the mA  and/or kV according to the patient's size, or iterative reconstruction  technique.    FINDINGS:     Abdomen: The liver, spleen, pancreas, adrenal glands and kidneys are  unremarkable. The gallbladder is present. Fluid is present within the  gastric lumen. No enlarged lymph nodes or free fluid in the upper  abdomen. Atherosclerotic calcification in the abdominal aorta.    Scan through the lower chest is significant for a small calcified  granuloma in the left lower lobe.    Pelvis: Fluid is present throughout the lumens of the small and large  bowel. There is mild distention of the mid small bowel, but no  convincing significant decompression of the distal small bowel to  suggest the presence of a  bowel obstruction. The appendix is  unremarkable. No visualized bowel wall thickening, pneumatosis or free  intraperitoneal gas. No enlarged lymph nodes or free fluid in the  pelvis.      Impression    IMPRESSION:   1. Fluid is present throughout the lumens of the stomach and small and  large bowel. This is nonspecific, but could relate to gastroenteritis.  2. No other cause of acute pain identified in the abdomen or pelvis.  The appendix is unremarkable.  3. No convincing evidence of bowel obstruction.    PRAVEEN SHANE MD

## 2017-08-24 NOTE — PLAN OF CARE
Problem: Goal Outcome Summary  Goal: Goal Outcome Summary  Outcome: No Change  Pt alert & oriented.  BP on low side 90s/50s, other VSS, LS clear on RA, on tele shows SR, up SBA.  C. Diff pending.  Need to collect urine.

## 2017-08-24 NOTE — PHARMACY-ADMISSION MEDICATION HISTORY
Admission medication history interview status for this patient is complete. See [facility] admission navigator for allergy information, prior to admission medications and immunization status.     Medication history interview source(s):Patient  Medication history resources (including written lists, pill bottles, clinic record):None  Primary pharmacy:[facility]    Changes made to PTA medication list:  Added: None  Deleted: None  Changed: None    Actions taken by pharmacist (provider contacted, etc):None     Additional medication history information:None    Medication reconciliation/reorder completed by provider prior to medication history? No    Do you take OTC medications (eg tylenol, ibuprofen, fish oil, eye/ear drops, etc)? N    Prior to Admission medications    Medication Sig Last Dose Taking? Auth Provider   lisinopril (PRINIVIL/ZESTRIL) 20 MG tablet Take 20 mg by mouth daily 8/23/2017 at am Yes Unknown, Entered By History

## 2017-08-24 NOTE — ED NOTES
"[facility]  ED Nurse Handoff Report    Harinder Rosario is a 56 year old male   ED Chief complaint: Dizziness  . ED Diagnosis:   Final diagnoses:   Acute kidney injury (H)   Hypovolemic shock (H)   Diarrhea, unspecified type     Allergies: No Known Allergies    Code Status: Full Code  Activity level - Baseline/Home:  Independent. Activity Level - Current:   Stand with Assist. Lift room needed: No. Bariatric: No   Needed: No   Isolation: Yes. Infection: Not Applicable  C-Diff Pending.     Vital Signs:   Vitals:    08/23/17 1829 08/23/17 1830 08/23/17 1840 08/23/17 1850   BP:  95/66 108/64 108/69   Resp:       Temp:       TempSrc:       SpO2: 100% 99% 99% 98%   Weight:       Height:           Cardiac Rhythm:  ,      Pain level:    Patient confused: No. Patient Falls Risk: Yes.   Elimination Status: Has voided   Patient Report - Initial Complaint: Diarrhea , weakness and intermittent vomiting for the past 3 days. Today feeling weak and dizzy. \"I think I ate some bad watermelon on Sunday\"   Airway, breathing, and circulatiion intact. . Focused Assessment:    Skin - Skin WDL:  WDL except; all Skin Temperature: cool Skin Moisture: clammy KB     17:30 Respiratory Respiratory - Respiratory WDL:  WDL except; all Rhythm/Pattern: shortness of breath reported KB    17:30 Other Flowsheet Documentation Other flowsheet entries - Score ([place] Coma Scale): 15 KB    17:30 Neurological Cognitive/Perceptual/Neuro - Cognitive/Neuro/Behavioral WDL:  WDL except; all Neurological Comment: dizziness and lightheadedness  Pupils (CN II) - Pupil PERRLA: yes  Motor Strength - Left Upper: 4 - active movement against gravity and some resistance Right Upper: 4 - active movement against gravity and some resistance Left Lower: 4 - active movement against gravity and some resistance Right Lower: 4 - active movement against gravity and some resistance  [place] Coma Scale - Best Eye Response: 4-->(E4) spontaneous Best Motor " Response: 6-->(M6) obeys commands Best Verbal Response: 5-->(V5) oriented [place] Coma Scale Score: 15          Tests Performed: labs, CT, EKG, UA, c-diff. Abnormal Results:   Labs Ordered and Resulted from Time of ED Arrival Up to the Time of Departure from the ED   CBC WITH PLATELETS DIFFERENTIAL - Abnormal; Notable for the following:        Result Value    WBC 14.8 (*)     Absolute Neutrophil 12.5 (*)     Absolute Monocytes 1.4 (*)     All other components within normal limits   COMPREHENSIVE METABOLIC PANEL - Abnormal; Notable for the following:     Carbon Dioxide 19 (*)     Glucose 188 (*)     Urea Nitrogen 49 (*)     Creatinine 4.77 (*)     GFR Estimate 13 (*)     GFR Estimate If Black 15 (*)     All other components within normal limits   BLOOD GAS VENOUS AND OXYHGB - Abnormal; Notable for the following:     Ph Venous 7.30 (*)     PCO2 Venous 37 (*)     PO2 Venous 21 (*)     Bicarbonate Venous 18 (*)     All other components within normal limits   LACTIC ACID WHOLE BLOOD - Abnormal; Notable for the following:     Lactic Acid 2.3 (*)     All other components within normal limits   ROUTINE UA WITH MICROSCOPIC - Abnormal; Notable for the following:     Glucose Urine 50 (*)     Blood Urine Moderate (*)     Protein Albumin Urine 100 (*)     WBC Urine 8 (*)     Bacteria Urine Few (*)     Squamous Epithelial /HPF Urine 2 (*)     Mucous Urine Present (*)     All other components within normal limits   ISTAT BASIC MET ICA HCT POCT - Abnormal; Notable for the following:     Total CO2 17 (*)     Glucose 188 (*)     Urea Nitrogen 49 (*)     Creatinine 4.9 (*)     GFR Estimate 12 (*)     GFR Estimate If Black 15 (*)     Hemoglobin 19.4 (*)     Hematocrit - POCT 57 (*)     All other components within normal limits   MAGNESIUM   TROPONIN I   ISTAT GAS OR ELECTROLYTE NURSING POCT   PERIPHERAL IV CATHETER   CLOSTRIDIUM DIFFICILE TOXIN B   ENTERIC BACTERIA AND VIRUS PANEL BY KEVIN STOOL     .   Treatments provided: 3L IV  fluids  Family Comments: daughter at bedside  OBS brochure/video discussed/provided to patient:  N/A  ED Medications:   Medications   lactated ringers BOLUS 2,000 mL (0 mLs Intravenous Stopped 8/23/17 1821)   0.9% sodium chloride BOLUS (0 mLs Intravenous Stopped 8/23/17 1750)     Drips infusing:  No  For the majority of the shift this patient was Green. Interventions performed were none.     Severe Sepsis OR Septic Shock Diagnosis Present: No      ED Nurse Name/Phone Number: Tenisha Portillo,   7:38 PM    RECEIVING UNIT ED HANDOFF REVIEW    Above ED Nurse Handoff Report was reviewed: Yes  Reviewed by: Crista Hawthorne on August 23, 2017 at 7:56 PM

## 2017-08-25 VITALS
DIASTOLIC BLOOD PRESSURE: 55 MMHG | TEMPERATURE: 97 F | OXYGEN SATURATION: 97 % | SYSTOLIC BLOOD PRESSURE: 101 MMHG | RESPIRATION RATE: 16 BRPM | BODY MASS INDEX: 42.66 KG/M2 | HEART RATE: 89 BPM | WEIGHT: 315 LBS | HEIGHT: 72 IN

## 2017-08-25 LAB
ANION GAP SERPL CALCULATED.3IONS-SCNC: 7 MMOL/L (ref 3–14)
BUN SERPL-MCNC: 38 MG/DL (ref 7–30)
CALCIUM SERPL-MCNC: 9.2 MG/DL (ref 8.5–10.1)
CHLORIDE SERPL-SCNC: 111 MMOL/L (ref 94–109)
CK SERPL-CCNC: 281 U/L (ref 30–300)
CO2 SERPL-SCNC: 22 MMOL/L (ref 20–32)
CREAT SERPL-MCNC: 1.63 MG/DL (ref 0.66–1.25)
ERYTHROCYTE [DISTWIDTH] IN BLOOD BY AUTOMATED COUNT: 13.9 % (ref 10–15)
GFR SERPL CREATININE-BSD FRML MDRD: 44 ML/MIN/1.7M2
GLUCOSE BLDC GLUCOMTR-MCNC: 115 MG/DL (ref 70–99)
GLUCOSE BLDC GLUCOMTR-MCNC: 94 MG/DL (ref 70–99)
GLUCOSE SERPL-MCNC: 116 MG/DL (ref 70–99)
HCT VFR BLD AUTO: 42.1 % (ref 40–53)
HGB BLD-MCNC: 13.6 G/DL (ref 13.3–17.7)
MCH RBC QN AUTO: 28.8 PG (ref 26.5–33)
MCHC RBC AUTO-ENTMCNC: 32.3 G/DL (ref 31.5–36.5)
MCV RBC AUTO: 89 FL (ref 78–100)
PLATELET # BLD AUTO: 250 10E9/L (ref 150–450)
POTASSIUM SERPL-SCNC: 4.3 MMOL/L (ref 3.4–5.3)
RBC # BLD AUTO: 4.72 10E12/L (ref 4.4–5.9)
SODIUM SERPL-SCNC: 140 MMOL/L (ref 133–144)
WBC # BLD AUTO: 7.3 10E9/L (ref 4–11)

## 2017-08-25 PROCEDURE — 85027 COMPLETE CBC AUTOMATED: CPT | Performed by: INTERNAL MEDICINE

## 2017-08-25 PROCEDURE — 25000128 H RX IP 250 OP 636: Performed by: INTERNAL MEDICINE

## 2017-08-25 PROCEDURE — 99238 HOSP IP/OBS DSCHRG MGMT 30/<: CPT | Performed by: INTERNAL MEDICINE

## 2017-08-25 PROCEDURE — 36415 COLL VENOUS BLD VENIPUNCTURE: CPT | Performed by: INTERNAL MEDICINE

## 2017-08-25 PROCEDURE — 82550 ASSAY OF CK (CPK): CPT | Performed by: INTERNAL MEDICINE

## 2017-08-25 PROCEDURE — 00000146 ZZHCL STATISTIC GLUCOSE BY METER IP

## 2017-08-25 PROCEDURE — 80048 BASIC METABOLIC PNL TOTAL CA: CPT | Performed by: INTERNAL MEDICINE

## 2017-08-25 RX ADMIN — HEPARIN SODIUM 5000 UNITS: 10000 INJECTION, SOLUTION INTRAVENOUS; SUBCUTANEOUS at 07:50

## 2017-08-25 RX ADMIN — SODIUM CHLORIDE, PRESERVATIVE FREE: 5 INJECTION INTRAVENOUS at 02:09

## 2017-08-25 NOTE — PLAN OF CARE
Problem: Goal Outcome Summary  Goal: Goal Outcome Summary  Outcome: Improving  Sleeping well. BRPs independently. Voiding adequately. Denies any further stoolingor nausea. Denies discomfort. Bowel sounds active.

## 2017-08-25 NOTE — DISCHARGE SUMMARY
[facility]  Discharge Summary  Hospitalist      Date of Admission:  8/23/2017  Date of Discharge:  8/25/2017  Provider:  Cory Lawrence MD. [facility]  Date of Service (when I last saw the patient): 08/25/17      Primary Provider: Margarita Ref-Primary, Physician          Discharge Diagnosis:     Discharge Diagnoses   Acute renal failure prerenal with possible ATN  Viral gastroenteritis  Hyperglycemia borderline diabetes A1c 6.1 needs follow-up  Hypertension initially hypotensive because of dehydration    Other medical issues:  Past Medical History:   Diagnosis Date     Hypertension          Please see the admission history and physical for full details.     Hospital Course     Harinder Rosario was admitted on 8/23/2017.  The following problems were addressed during his hospitalization:  56-year-old man admitted to the hospital with nausea and loose stool and acute renal injury.  Patient is on lisinopril for chronic hypertension.  His lisinopril was held, he was started on intravenous fluid, he had negative Clostridium difficile and stool cultures.  His gastroenteritis symptoms improved in the 48 hours following his admission, on the day of discharge he was tolerating normal diet, had a normal bowel movement and was symptom-free.  His creatinine did improve from 4.9 on admission down to 1.6, his renal failure likely was a combination of prerenal acidemia from dehydration and an element of ATN likely secondary to ACE inhibitor use when he was very dehydrated from gastroenteritis.  His lisinopril was kept on hold on discharge needs follow-up on Monday in primary care clinic blood pressure is still in the low 100 systolic.  His primary care to address his medication on Monday if his kidney function is back to normal he can resume his lisinopril otherwise in alternative medication is reasonable and a referral to nephrology  Blood sugar was borderline elevated, patient was counseled on monitoring his carbohydrate  intake, he needs follow-up and his primary care clinic, hemoglobin A1c is 6.1 he might be prediabetic.      Pending Results   Unresulted Labs Ordered in the Past 30 Days of this Admission     No orders found from 6/24/2017 to 8/24/2017.          Discharge Orders     Reason for your hospital stay   Severe gastroenteritis resulting in acute renal failure     Follow-up and recommended labs and tests    Follow-up with primary care on Monday with a repeat basic metabolic panel and discuss antihypertensive (need for an alternative or resuming lisinopril if kidney function allows)     Activity   Your activity upon discharge: activity as tolerated     Full Code     Diet   Follow this diet upon discharge: Orders Placed This Encounter     Combination Diet Regular Diet Adult; Renal Diet         Code Status   Full Code       Primary Care Physician   Physician No Ref-Primary    Physical Exam   Temp: 97  F (36.1  C) Temp src: Oral BP: 101/55 Pulse: 89 Heart Rate: 76 Resp: 16 SpO2: 97 % O2 Device: None (Room air)    Vitals:    08/23/17 1721 08/24/17 0655 08/25/17 0641   Weight: (!) 145.2 kg (320 lb) (!) 146.3 kg (322 lb 8 oz) (!) 146.6 kg (323 lb 1.6 oz)     Vital Signs with Ranges  Temp:  [97  F (36.1  C)-98.6  F (37  C)] 97  F (36.1  C)  Pulse:  [89] 89  Heart Rate:  [71-84] 76  Resp:  [16] 16  BP: (101-105)/(50-58) 101/55  SpO2:  [95 %-97 %] 97 %  I/O last 3 completed shifts:  In: 2233 [P.O.:580; I.V.:1653]  Out: 2450 [Urine:2450]    Constitutional:  alert, cooperative, no apparent distress  Respiratory: No increased work of breathing, good air exchange, no crackles or wheezing.  Cardiovascular: apical impulse,normal S1 and S2  GI: bowel sounds present, soft, non-distended, non-tender      Discharge Disposition   Discharged to home    Consultations This Hospital Stay   None    Time Spent on this Encounter   Cory NUNEZ, personally saw the patient today and spent less than or equal to 30 minutes discharging this  patient.      Discharge Medications   Current Discharge Medication List      STOP taking these medications       lisinopril (PRINIVIL/ZESTRIL) 20 MG tablet Comments:   Reason for Stopping:             Allergies   No Known Allergies  Data   Most Recent 3 CBC's:  Recent Labs   Lab Test  08/25/17   0858  08/24/17   0640  08/23/17   2230  08/23/17   1749  08/23/17   1747   WBC  7.3  12.1*   --    --   14.8*   HGB  13.6  14.2   --   19.4*  16.3   MCV  89  88   --    --   89   PLT  250  283  297   --   394      Most Recent 3 BMP's:  Recent Labs   Lab Test  08/25/17   0858  08/24/17   0640  08/23/17   1749 08/23/17   1747   NA  140  135  138  137   POTASSIUM  4.3  4.0  4.3  4.4   CHLORIDE  111*  105  106  105   CO2  22  20   --   19*   BUN  38*  57*  49*  49*   CR  1.63*  3.38*   --   4.77*   ANIONGAP  7  10  15  13   LILIA  9.2  9.1   --   9.0   GLC  116*  124*  188*  188*     Most Recent 2 LFT's:  Recent Labs   Lab Test  08/23/17   1747   AST  12   ALT  26   ALKPHOS  98   BILITOTAL  0.5     Most Recent INR's and Anticoagulation Dosing History:  Anticoagulation Dose History     There is no flowsheet data to display.        Recent Labs   Lab Test  08/24/17   0640   A1C  6.1*     Results for orders placed or performed during the hospital encounter of 08/23/17   Abd/pelvis CT no contrast - Stone Protocol    Narrative    CT ABDOMEN AND PELVIS WITHOUT CONTRAST   8/23/2017 7:30 PM     HISTORY: Acute renal failure. Diarrhea.    COMPARISON: None.    TECHNIQUE: Intravenous contrast was not administered due to suboptimal  renal function. Without intravenous contrast, helical sections were  acquired from the top of the diaphragm through the pubic symphysis.  Coronal reconstructions were generated. Radiation dose for this scan  was reduced using automated exposure control, adjustment of the mA  and/or kV according to the patient's size, or iterative reconstruction  technique.    FINDINGS:     Abdomen: The liver, spleen, pancreas,  adrenal glands and kidneys are  unremarkable. The gallbladder is present. Fluid is present within the  gastric lumen. No enlarged lymph nodes or free fluid in the upper  abdomen. Atherosclerotic calcification in the abdominal aorta.    Scan through the lower chest is significant for a small calcified  granuloma in the left lower lobe.    Pelvis: Fluid is present throughout the lumens of the small and large  bowel. There is mild distention of the mid small bowel, but no  convincing significant decompression of the distal small bowel to  suggest the presence of a bowel obstruction. The appendix is  unremarkable. No visualized bowel wall thickening, pneumatosis or free  intraperitoneal gas. No enlarged lymph nodes or free fluid in the  pelvis.      Impression    IMPRESSION:   1. Fluid is present throughout the lumens of the stomach and small and  large bowel. This is nonspecific, but could relate to gastroenteritis.  2. No other cause of acute pain identified in the abdomen or pelvis.  The appendix is unremarkable.  3. No convincing evidence of bowel obstruction.    PRAVEEN SHANE MD             Disclaimer: This note consists of symbols derived from keyboarding, dictation and/or voice recognition software. As a result, there may be errors in the script that have gone undetected. Please consider this when interpreting information found in this chart.

## 2017-08-25 NOTE — PLAN OF CARE
Problem: Individualization  Goal: Patient Preferences  Outcome: Adequate for Discharge Date Met:  08/25/17  Pt being discharged to home today explained DC paperwork including s/s to monitor for, diet, activity, medication lisinopril to be stopped. general questions answered.  Pt will follow up with MD on Monday as directed and is aware of to watch for symptoms to return. Pt dc at 1230 with daughter

## 2017-08-25 NOTE — DISCHARGE INSTRUCTIONS
An appointment has been scheduled for you to establish care and f/u with post hospital.  Please arrive 15 min prior to appointment with insurance information.    Dr. Lorin Gallegos [address] [place] [address]  Contact  Appointments: [phone])   Information: [phone]   Fax: [phone]     Appointment scheduled for   8/28/17 at 3:40pm

## 2017-08-25 NOTE — PLAN OF CARE
Pt up ind. VSS LS clear. BS hypo. States had 3-4 loose stools today. Voiding adequate amount. Denies pain. Tolerating renal diet, denies nausea, appetite good. C/o heartburn, gave Tums, helpful. On tele. Denies dizziness.

## 2017-08-28 ENCOUNTER — OFFICE VISIT (OUTPATIENT)
Dept: PEDIATRICS | Facility: CLINIC | Age: 57
End: 2017-08-28
Payer: COMMERCIAL

## 2017-08-28 VITALS
SYSTOLIC BLOOD PRESSURE: 130 MMHG | OXYGEN SATURATION: 99 % | HEIGHT: 72 IN | BODY MASS INDEX: 42.66 KG/M2 | TEMPERATURE: 98 F | DIASTOLIC BLOOD PRESSURE: 78 MMHG | WEIGHT: 315 LBS | HEART RATE: 61 BPM

## 2017-08-28 DIAGNOSIS — N17.0 ACUTE RENAL FAILURE WITH TUBULAR NECROSIS (H): Primary | ICD-10-CM

## 2017-08-28 DIAGNOSIS — Z12.11 SCREEN FOR COLON CANCER: ICD-10-CM

## 2017-08-28 DIAGNOSIS — M25.561 RIGHT KNEE PAIN, UNSPECIFIED CHRONICITY: ICD-10-CM

## 2017-08-28 DIAGNOSIS — E66.01 MORBID OBESITY DUE TO EXCESS CALORIES (H): ICD-10-CM

## 2017-08-28 DIAGNOSIS — I10 BENIGN ESSENTIAL HYPERTENSION: ICD-10-CM

## 2017-08-28 PROBLEM — Z85.828 HISTORY OF BASAL CELL CARCINOMA: Status: ACTIVE | Noted: 2017-08-28

## 2017-08-28 PROCEDURE — 99495 TRANSJ CARE MGMT MOD F2F 14D: CPT | Performed by: INTERNAL MEDICINE

## 2017-08-28 PROCEDURE — 36415 COLL VENOUS BLD VENIPUNCTURE: CPT | Performed by: INTERNAL MEDICINE

## 2017-08-28 PROCEDURE — 80048 BASIC METABOLIC PNL TOTAL CA: CPT | Performed by: INTERNAL MEDICINE

## 2017-08-28 NOTE — NURSING NOTE
Chief Complaint   Patient presents with     Hospital F/U       Initial /78 (BP Location: Right arm, Patient Position: Chair, Cuff Size: Adult Large)  Pulse 61  Temp 98  F (36.7  C) (Tympanic)  Ht 6' (1.829 m)  Wt (!) 325 lb 6.4 oz (147.6 kg)  SpO2 99%  BMI 44.13 kg/m2 Estimated body mass index is 44.13 kg/(m^2) as calculated from the following:    Height as of this encounter: 6' (1.829 m).    Weight as of this encounter: 325 lb 6.4 oz (147.6 kg).  Medication Reconciliation: complete   Candelaria Mullen LPN

## 2017-08-28 NOTE — PATIENT INSTRUCTIONS
1. Labs today, electrolytes and kidney function  2. If kidney function looks ok, will have restart lisinopril  3. You will be contacted to set up the colonoscopy  4. Ice, ibuprofen as needed for knee/leg pain - if worsening, will have see sports medicine. Can call for referral

## 2017-08-28 NOTE — PROGRESS NOTES
SUBJECTIVE:   Harinder Rosario is a 56 year old male who presents to clinic today for the following health issues:    Hospital Follow-up Visit:    Hospital/Nursing Home/IP Rehab Facility: [facility]  Date of Admission: 8/23/17  Date of Discharge: 8/25/17  Reason(s) for Admission: ARF, HTN, gastroenteritis            Problems taking medications regularly:  None       Medication changes since discharge: None       Problems adhering to non-medication therapy:  None    Summary of hospitalization:  [facility] discharge summary reviewed  Diagnostic Tests/Treatments reviewed.  Follow up needed: BMP, BP check  Other Healthcare Providers Involved in Patient s Care:         None  Update since discharge: improved.     Post Discharge Medication Reconciliation: discharge medications reconciled and changed, per note/orders (see AVS).  Plan of care communicated with patient and family    Patient new to clinic. Previous care at [facility], but did not attend appointments on regular basis. Main medical issues have been hypertension and obesity in the past. Did have basal cell carcinoma removed from face a couple of years ago.    Patient hospitalized for Acute Renal Failure due to dehydration and ATN from lisinopril. Had severe gastroenteritis and hypotension also associated with the dehydration. Lisinopril was helped and he was given IV fluids. His creatinine improved, but was still elevated on discharge and his blood pressure was in the low normal range so he was discharged without the lisinopril. Has been on lisinopril at least 4 years. No previous side effects. All gastroenteritis symptoms have resolved and he feels back to baseline.    Knee pain: right knee. Had arthroscopic surgery when younger and had cartilage removed. Happens a couple of times a week. Pain mostly in the back. Has increased pain if sits too much. Gets stiff. Doesn't feel swollen. Wonders if due to arthritis.    RHM: has never had  colonoscopy, but would be willing to do this. Tdap UTD.      Reviewed and updated as needed this visit by clinical staff  Tobacco  Allergies  Meds  Problems  Med Hx  Surg Hx  Fam Hx  Soc Hx        Reviewed and updated as needed this visit by Provider  Allergies  Meds  Problems         -------------------------------------    Problem list and histories reviewed & adjusted, as indicated.  Additional history: as documented    ROS:  Constitutional, HEENT, cardiovascular, pulmonary, GI, , musculoskeletal, neuro, skin, endocrine and psych systems are negative, except as otherwise noted.      Problem list, Medication list, Allergies, and Medical/Social/Surgical histories reviewed in [facility] and updated as appropriate.    OBJECTIVE:                                                    /78 (BP Location: Right arm, Patient Position: Chair, Cuff Size: Adult Large)  Pulse 61  Temp 98  F (36.7  C) (Tympanic)  Ht 6' (1.829 m)  Wt (!) 325 lb 6.4 oz (147.6 kg)  SpO2 99%  BMI 44.13 kg/m2   Body mass index is 44.13 kg/(m^2).  General Appearance: morbidly obese, alert and no distress  Eyes:   no discharge, erythema.  Normal pupils.  Respiratory: lungs clear to auscultation - no rales, rhonchi or wheezes.  Cardiovascular: regular rate and rhythm, normal S1 S2, no S3 or S4 and no murmur, click or rub.  No peripheral edema.  Abdomen: obese, soft, nontender, no hepatosplenomegaly or masses, and bowel sounds normal  Musculoskeletal: left knee with mildly decreased flexion, no effusion or erythema. No tenderness over joint space. Points to posterior knee and upper portion of gastrocnemius as location of pain.   Skin: no rashes or lesions.  Well perfused and normal turgor.    Diagnostic Test Results:  Results for orders placed or performed in visit on 08/28/17   Basic metabolic panel  (Ca, Cl, CO2, Creat, Gluc, K, Na, BUN)   Result Value Ref Range    Sodium 140 133 - 144 mmol/L    Potassium 4.2 3.4 - 5.3 mmol/L    Chloride  106 94 - 109 mmol/L    Carbon Dioxide 28 20 - 32 mmol/L    Anion Gap 6 3 - 14 mmol/L    Glucose 97 70 - 99 mg/dL    Urea Nitrogen 16 7 - 30 mg/dL    Creatinine 1.19 0.66 - 1.25 mg/dL    GFR Estimate 64 >60 mL/min/1.7m2    GFR Estimate If Black 77 >60 mL/min/1.7m2    Calcium 8.7 8.5 - 10.1 mg/dL        ASSESSMENT/PLAN:                                                      (N17.0) Acute renal failure with tubular necrosis (H)  (primary encounter diagnosis)  Comment: resolved  Plan: Basic metabolic panel  (Ca, Cl, CO2, Creat,         Gluc, K, Na, BUN)  - discussed risk factors for acute renal failure  - advised to hold lisinopril in future if having significant diarrhea and monitor BP at home (has a cuff)    (I10) Benign essential hypertension  Comment: controlled, but creeping back up  Plan:   - restart lisinopril 20 mg once a day - does not need refills    (M25.561) Right knee pain, unspecified chronicity  Comment: intermittent posterior pain. No popliteal cyst palpated, but possible given weight. Location c/w cyst vs tendonitis of gastrocnemius  Plan:   - ice, ibuprofen as needed  - discussed weight loss  - if worsening, will have see sports medicine - will call if needs a referral    (E66.01) Morbid obesity due to excess calories (H)  Comment: BMI 44  Plan: recommend diet/exercise and weight loss    (Z12.11) Screen for colon cancer  Plan: GASTROENTEROLOGY ADULT REF PROCEDURE ONLY    Follow up with Provider - annual visit and as needed     [facility]

## 2017-08-28 NOTE — MR AVS SNAPSHOT
After Visit Summary   8/28/2017    Harinder Rosario    MRN: [id number]           Patient Information     Date Of Birth          1960        Visit Information        Provider Department      8/28/2017 3:40 PM Lorin Ohara MD [facility]        Today's Diagnoses     Acute renal failure with tubular necrosis (H)    -  1    Benign essential hypertension        Right knee pain, unspecified chronicity        Screen for colon cancer          Care Instructions    1. Labs today, electrolytes and kidney function  2. If kidney function looks ok, will have restart lisinopril  3. You will be contacted to set up the colonoscopy  4. Ice, ibuprofen as needed for knee/leg pain - if worsening, will have see sports medicine. Can call for referral          Follow-ups after your visit        Additional Services     GASTROENTEROLOGY ADULT REF PROCEDURE ONLY       Last Lab Result: Creatinine (mg/dL)       Date                     Value                 08/25/2017               1.63 (H)         ----------  Body mass index is 44.13 kg/(m^2).      Patient will be contacted to schedule procedure.     Please be aware that coverage of these services is subject to the terms and limitations of your health insurance plan.  Call member services at your health plan with any benefit or coverage questions.  Any procedures must be performed at a [facility] facility OR coordinated by your clinic's referral office.    Please bring the following with you to your appointment:    (1) Any X-Rays, CTs or MRIs which have been performed.  Contact the facility where they were done to arrange for  prior to your scheduled appointment.    (2) List of current medications   (3) This referral request   (4) Any documents/labs given to you for this referral                  Who to contact     If you have questions or need follow up information about today's clinic visit or your schedule please contact [facility] LATISHA  "directly at [phone].  Normal or non-critical lab and imaging results will be communicated to you by [employer], letter or phone within 4 business days after the clinic has received the results. If you do not hear from us within 7 days, please contact the clinic through [employer] or phone. If you have a critical or abnormal lab result, we will notify you by phone as soon as possible.  Submit refill requests through [employer] or call your pharmacy and they will forward the refill request to us. Please allow 3 business days for your refill to be completed.          Additional Information About Your Visit        [employer] Information     [employer] lets you send messages to your doctor, view your test results, renew your prescriptions, schedule appointments and more. To sign up, go to [email or web address] . Click on \"Log in\" on the left side of the screen, which will take you to the Welcome page. Then click on \"Sign up Now\" on the right side of the page.     You will be asked to enter the access code listed below, as well as some personal information. Please follow the directions to create your username and password.     Your access code is: [email or web address]  Expires: 2017  7:48 PM     Your access code will  in 90 days. If you need help or a new code, please call your [facility] clinic or [phone].        Care EveryWhere ID     This is your Care EveryWhere ID. This could be used by other organizations to access your [facility] medical records  [id number]        Your Vitals Were     Pulse Temperature Height Pulse Oximetry BMI (Body Mass Index)       61 98  F (36.7  C) (Tympanic) 6' (1.829 m) 99% 44.13 kg/m2        Blood Pressure from Last 3 Encounters:   17 130/78   17 101/55    Weight from Last 3 Encounters:   17 (!) 325 lb 6.4 oz (147.6 kg)   17 (!) 323 lb 1.6 oz (146.6 kg)              We Performed the Following     Basic metabolic panel  (Ca, Cl, CO2, Creat, Gluc, K, Na, BUN)     " GASTROENTEROLOGY ADULT REF PROCEDURE ONLY        Primary Care Provider Office Phone # Fax #    Lorin Ohara [phone] [address] [place] [address]        Equal Access to Services     [facility] ARY : Hadii aad ku hadyolandao Soomaali, waaxda luqadaha, qaybta kaalmada adesharita, arabella jackson laAmitaadamaris gutiérrez. So [facility] [phone].    ATENCIÓN: Si habla español, tiene a marshall disposición servicios gratuitos de asistencia lingüística. Llame al [phone].    We comply with applicable federal civil rights laws and [place] laws. We do not discriminate on the basis of race, color, national origin, age, disability sex, sexual orientation or gender identity.            Thank you!     Thank you for choosing [facility]  for your care. Our goal is always to provide you with excellent care. Hearing back from our patients is one way we can continue to improve our services. Please take a few minutes to complete the written survey that you may receive in the mail after your visit with us. Thank you!             Your Updated Medication List - Protect others around you: Learn how to safely use, store and throw away your medicines at www.disposemymeds.org.      Notice  As of 8/28/2017  3:56 PM    You have not been prescribed any medications.

## 2017-08-29 LAB
ANION GAP SERPL CALCULATED.3IONS-SCNC: 6 MMOL/L (ref 3–14)
BUN SERPL-MCNC: 16 MG/DL (ref 7–30)
CALCIUM SERPL-MCNC: 8.7 MG/DL (ref 8.5–10.1)
CHLORIDE SERPL-SCNC: 106 MMOL/L (ref 94–109)
CO2 SERPL-SCNC: 28 MMOL/L (ref 20–32)
CREAT SERPL-MCNC: 1.19 MG/DL (ref 0.66–1.25)
GFR SERPL CREATININE-BSD FRML MDRD: 64 ML/MIN/1.7M2
GLUCOSE SERPL-MCNC: 97 MG/DL (ref 70–99)
POTASSIUM SERPL-SCNC: 4.2 MMOL/L (ref 3.4–5.3)
SODIUM SERPL-SCNC: 140 MMOL/L (ref 133–144)

## 2017-08-30 ENCOUNTER — HOSPITAL ENCOUNTER (EMERGENCY)
Facility: CLINIC | Age: 57
Discharge: HOME | End: 2017-08-30
Attending: EMERGENCY MEDICINE | Admitting: EMERGENCY MEDICINE
Payer: COMMERCIAL

## 2017-08-30 ENCOUNTER — APPOINTMENT (OUTPATIENT)
Dept: GENERAL RADIOLOGY | Facility: CLINIC | Age: 57
End: 2017-08-30
Attending: EMERGENCY MEDICINE
Payer: COMMERCIAL

## 2017-08-30 ENCOUNTER — OFFICE VISIT (OUTPATIENT)
Dept: URGENT CARE | Facility: URGENT CARE | Age: 57
End: 2017-08-30
Payer: COMMERCIAL

## 2017-08-30 VITALS
BODY MASS INDEX: 42.66 KG/M2 | WEIGHT: 315 LBS | SYSTOLIC BLOOD PRESSURE: 133 MMHG | RESPIRATION RATE: 16 BRPM | OXYGEN SATURATION: 98 % | HEART RATE: 68 BPM | TEMPERATURE: 99.2 F | DIASTOLIC BLOOD PRESSURE: 78 MMHG | HEIGHT: 72 IN

## 2017-08-30 VITALS
HEART RATE: 80 BPM | DIASTOLIC BLOOD PRESSURE: 80 MMHG | OXYGEN SATURATION: 95 % | SYSTOLIC BLOOD PRESSURE: 130 MMHG | TEMPERATURE: 98.7 F | WEIGHT: 315 LBS | BODY MASS INDEX: 44.08 KG/M2

## 2017-08-30 DIAGNOSIS — R11.2 INTRACTABLE VOMITING WITH NAUSEA, UNSPECIFIED VOMITING TYPE: Primary | ICD-10-CM

## 2017-08-30 DIAGNOSIS — K29.00 OTHER ACUTE GASTRITIS: ICD-10-CM

## 2017-08-30 LAB
ALBUMIN SERPL-MCNC: 3.4 G/DL (ref 3.4–5)
ALP SERPL-CCNC: 87 U/L (ref 40–150)
ALT SERPL W P-5'-P-CCNC: 34 U/L (ref 0–70)
ANION GAP SERPL CALCULATED.3IONS-SCNC: 7 MMOL/L (ref 3–14)
AST SERPL W P-5'-P-CCNC: 16 U/L (ref 0–45)
BASOPHILS # BLD AUTO: 0.1 10E9/L (ref 0–0.2)
BASOPHILS NFR BLD AUTO: 0.5 %
BILIRUB SERPL-MCNC: 0.2 MG/DL (ref 0.2–1.3)
BUN SERPL-MCNC: 16 MG/DL (ref 7–30)
CALCIUM SERPL-MCNC: 8.7 MG/DL (ref 8.5–10.1)
CHLORIDE SERPL-SCNC: 107 MMOL/L (ref 94–109)
CO2 SERPL-SCNC: 27 MMOL/L (ref 20–32)
CREAT SERPL-MCNC: 1.25 MG/DL (ref 0.66–1.25)
DIFFERENTIAL METHOD BLD: NORMAL
EOSINOPHIL # BLD AUTO: 0.1 10E9/L (ref 0–0.7)
EOSINOPHIL NFR BLD AUTO: 1 %
ERYTHROCYTE [DISTWIDTH] IN BLOOD BY AUTOMATED COUNT: 13.5 % (ref 10–15)
GFR SERPL CREATININE-BSD FRML MDRD: 60 ML/MIN/1.7M2
GLUCOSE SERPL-MCNC: 102 MG/DL (ref 70–99)
HCT VFR BLD AUTO: 41.7 % (ref 40–53)
HGB BLD-MCNC: 13.6 G/DL (ref 13.3–17.7)
IMM GRANULOCYTES # BLD: 0.1 10E9/L (ref 0–0.4)
IMM GRANULOCYTES NFR BLD: 0.9 %
LIPASE SERPL-CCNC: 175 U/L (ref 73–393)
LYMPHOCYTES # BLD AUTO: 2.2 10E9/L (ref 0.8–5.3)
LYMPHOCYTES NFR BLD AUTO: 19.8 %
MCH RBC QN AUTO: 28.5 PG (ref 26.5–33)
MCHC RBC AUTO-ENTMCNC: 32.6 G/DL (ref 31.5–36.5)
MCV RBC AUTO: 87 FL (ref 78–100)
MONOCYTES # BLD AUTO: 0.8 10E9/L (ref 0–1.3)
MONOCYTES NFR BLD AUTO: 7.7 %
NEUTROPHILS # BLD AUTO: 7.6 10E9/L (ref 1.6–8.3)
NEUTROPHILS NFR BLD AUTO: 70.1 %
NRBC # BLD AUTO: 0 10*3/UL
NRBC BLD AUTO-RTO: 0 /100
PLATELET # BLD AUTO: 271 10E9/L (ref 150–450)
POTASSIUM SERPL-SCNC: 3.9 MMOL/L (ref 3.4–5.3)
PROT SERPL-MCNC: 7.1 G/DL (ref 6.8–8.8)
RBC # BLD AUTO: 4.77 10E12/L (ref 4.4–5.9)
SODIUM SERPL-SCNC: 141 MMOL/L (ref 133–144)
TROPONIN I SERPL-MCNC: <0.015 UG/L (ref 0–0.04)
WBC # BLD AUTO: 10.9 10E9/L (ref 4–11)

## 2017-08-30 PROCEDURE — 96374 THER/PROPH/DIAG INJ IV PUSH: CPT

## 2017-08-30 PROCEDURE — 99284 EMERGENCY DEPT VISIT MOD MDM: CPT | Mod: 25

## 2017-08-30 PROCEDURE — 80053 COMPREHEN METABOLIC PANEL: CPT | Performed by: EMERGENCY MEDICINE

## 2017-08-30 PROCEDURE — 71020 XR CHEST 2 VW: CPT

## 2017-08-30 PROCEDURE — 96361 HYDRATE IV INFUSION ADD-ON: CPT

## 2017-08-30 PROCEDURE — 25000132 ZZH RX MED GY IP 250 OP 250 PS 637: Performed by: EMERGENCY MEDICINE

## 2017-08-30 PROCEDURE — 25000128 H RX IP 250 OP 636: Performed by: EMERGENCY MEDICINE

## 2017-08-30 PROCEDURE — 99213 OFFICE O/P EST LOW 20 MIN: CPT | Performed by: FAMILY MEDICINE

## 2017-08-30 PROCEDURE — 25000125 ZZHC RX 250: Performed by: EMERGENCY MEDICINE

## 2017-08-30 PROCEDURE — 84484 ASSAY OF TROPONIN QUANT: CPT | Performed by: EMERGENCY MEDICINE

## 2017-08-30 PROCEDURE — 85025 COMPLETE CBC W/AUTO DIFF WBC: CPT | Performed by: EMERGENCY MEDICINE

## 2017-08-30 PROCEDURE — 83690 ASSAY OF LIPASE: CPT | Performed by: EMERGENCY MEDICINE

## 2017-08-30 RX ORDER — SUCRALFATE ORAL 1 G/10ML
1 SUSPENSION ORAL 2 TIMES DAILY
Qty: 140 ML | Refills: 0 | Status: SHIPPED | OUTPATIENT
Start: 2017-08-30 | End: 2017-09-06

## 2017-08-30 RX ORDER — LISINOPRIL 20 MG/1
20 TABLET ORAL DAILY
Qty: 30 TABLET | COMMUNITY
Start: 2017-08-30

## 2017-08-30 RX ORDER — ONDANSETRON 2 MG/ML
4 INJECTION INTRAMUSCULAR; INTRAVENOUS EVERY 30 MIN PRN
Status: DISCONTINUED | OUTPATIENT
Start: 2017-08-30 | End: 2017-08-30 | Stop reason: HOSPADM

## 2017-08-30 RX ORDER — ONDANSETRON 8 MG/1
8 TABLET, ORALLY DISINTEGRATING ORAL EVERY 6 HOURS PRN
Qty: 10 TABLET | Refills: 0 | Status: SHIPPED | OUTPATIENT
Start: 2017-08-30 | End: 2017-09-02

## 2017-08-30 RX ORDER — SODIUM CHLORIDE 9 MG/ML
1000 INJECTION, SOLUTION INTRAVENOUS CONTINUOUS
Status: DISCONTINUED | OUTPATIENT
Start: 2017-08-30 | End: 2017-08-30 | Stop reason: HOSPADM

## 2017-08-30 RX ADMIN — SODIUM CHLORIDE 1000 ML: 9 INJECTION, SOLUTION INTRAVENOUS at 14:55

## 2017-08-30 RX ADMIN — LIDOCAINE HYDROCHLORIDE 30 ML: 20 SOLUTION ORAL; TOPICAL at 16:05

## 2017-08-30 RX ADMIN — ONDANSETRON 4 MG: 2 INJECTION INTRAMUSCULAR; INTRAVENOUS at 14:56

## 2017-08-30 ASSESSMENT — ENCOUNTER SYMPTOMS
VOMITING: 1
FEVER: 0
COUGH: 0
NAUSEA: 1
ABDOMINAL PAIN: 1

## 2017-08-30 NOTE — DISCHARGE INSTRUCTIONS
Gastritis (Adult)    Gastritis is inflammation and irritation of the stomach lining. It can be present for a short time (acute) or be long lasting (chronic). Gastritis is often caused by infection with bacteria called H pylori. More than a third of people in the [place] have this bacteria in their bodies. In many cases, H pylori causes no problems or symptoms. In some people, though, the infection irritates the stomach lining and causes gastritis. Other causes of stomach irritation include drinking alcohol or taking pain-relieving medicines called NSAIDs (such as aspirin or ibuprofen).   Symptoms of gastritis can include:    Abdominal pain or bloating    Loss of appetite    Nausea or vomiting    Vomiting blood or having black stools    Feeling more tired than usual  An inflamed and irritated stomach lining is more likely to develop a sore called an ulcer. To help prevent this, gastritis should be treated.  Home care  If needed, medicines may be prescribed. If you have H pylori infection, treating it will likely relieve your symptoms. Other changes can help reduce stomach irritation and help it heal.    If you have been prescribed medicines for H pylori infection, take them as directed. Take all of the medicine until it is finished or your healthcare provider tells you to stop, even if you feel better.    Your healthcare provider may recommend avoiding NSAIDs. If you take daily aspirin for your heart or other medical reasons, do not stop without talking to your healthcare provider first.    Avoid drinking alcohol.    Stop smoking. Smoking can irritate the stomach and delay healing. As much as possible, stay away from second hand smoke.  Follow-up care  Follow up with your healthcare provider, or as advised by our staff. Testing may be needed to check for inflammation or an ulcer.  When to seek medical advice  Call your healthcare provider for any of the following:    Stomach pain that gets worse or moves to the lower  right abdomen (appendix area)    Chest pain that appears or gets worse, or spreads to the back, neck, shoulder, or arm    Frequent vomiting (can t keep down liquids)    Blood in the stool or vomit (red or black in color)    Feeling weak or dizzy    Fever of 100.4 F (38 C) or higher, or as directed by your healthcare provider  Date Last Reviewed: 6/22/2015 2000-2017 [employer]. [address] [place] [address], [place] [id number]. All rights reserved. This information is not intended as a substitute for professional medical care. Always follow your healthcare professional's instructions.

## 2017-08-30 NOTE — ED NOTES
ABCs intact. Pt was admitted Wednesday-Friday with acute renal failure. Pt started vomiting on Saturday. Pt unable to keep anything down, may be able to take small sips water. Pt has had heartburn x 10 days.

## 2017-08-30 NOTE — NURSING NOTE
Chief Complaint   Patient presents with     Urgent Care     Vomiting     Pt states cant keep anything down since friday. States has heartburn really bad.        Initial /80 (BP Location: Right arm, Patient Position: Chair, Cuff Size: Adult Large)  Pulse 80  Temp 98.7  F (37.1  C) (Tympanic)  Wt (!) 325 lb (147.4 kg)  SpO2 95%  BMI 44.08 kg/m2 Estimated body mass index is 44.08 kg/(m^2) as calculated from the following:    Height as of 8/28/17: 6' (1.829 m).    Weight as of this encounter: 325 lb (147.4 kg).  Medication Reconciliation: unable or not appropriate to perform   Jorge Grey CMA (TAM) 8/30/2017 1:24 PM

## 2017-08-30 NOTE — ED AVS SNAPSHOT
[facility]    [address]    [facility] [address]    Phone:  [phone]    Fax:  [phone]                                       Harinder Rosario   MRN: [id number]    Department:  [facility]   Date of Visit:  8/30/2017           After Visit Summary Signature Page     I have received my discharge instructions, and my questions have been answered. I have discussed any challenges I see with this plan with the nurse or doctor.    ..........................................................................................................................................  Patient/Patient Representative Signature      ..........................................................................................................................................  Patient Representative Print Name and Relationship to Patient    ..................................................               ................................................  Date                                            Time    ..........................................................................................................................................  Reviewed by Signature/Title    ...................................................              ..............................................  Date                                                            Time

## 2017-08-30 NOTE — PROGRESS NOTES
SUBJECTIVE:   Harinder Rosario is a 56 year old male with a h/o chronic essential hypertension for which patient's lisinopril has recently been held due to recent acute renal failure with likely acute tubular necrosis during the patient's hospitalization at the [facility] from 8/23-8/25/2017.    He is presenting with a chief complaint of severe, frequent vomiting for the past six days and severe heartburn symptoms for the past 10 days. .    No diarrhea.  No fevers.      Patient was admitted to the [facility] on August 23, 2017, for nausea, viral gastroenteritis (the symptoms resolved in 48 hours during the hospitalization), acute renal failure associated with dehydration and possibly with acute tubular necrosis likely due to patient's Lisinopril.  Patient was discharged on August 25, 2017.  Patient's lisinopril was held and IV fluids were administered.  Patient's creatinine upon admission was 4.9 and then improved to 1.6 during his hospitalization.  Patient's Hemoglobin A1C was 6.1 on 8/24/2017.     Past Medical History:   Diagnosis Date     Hypertension      Current Outpatient Prescriptions   Medication Sig Dispense Refill     lisinopril (PRINIVIL/ZESTRIL) 20 MG tablet Take 1 tablet (20 mg) by mouth daily 30 tablet      Social History   Substance Use Topics     Smoking status: Never Smoker     Smokeless tobacco: Never Used     Alcohol use Yes      Comment: occassional         OBJECTIVE  :/80 (BP Location: Right arm, Patient Position: Chair, Cuff Size: Adult Large)  Pulse 80  Temp 98.7  F (37.1  C) (Tympanic)  Wt (!) 325 lb (147.4 kg)  SpO2 95%  BMI 44.08 kg/m2  GENERAL APPEARANCE: healthy, alert with frequent severe vomiting.   EYES: moist. No scleral icterus.   HENT: Mouth:  Moist.   ABDOMEN:  Obese abdomen.  Normal bowel sounds.  Some tenderness over the epigastrium.  No rebound/guarding/hepatosplenomegaly.    ASSESSMENT:  Severe Vomiting.     PLAN:  Given the patient's  severe symptoms, patient will go to the [facility] emergency room for further evaluation.  Possible partial bowel obstruction vs gastroparesis as the cause of the symptoms.     Anthony Sanches MD

## 2017-08-30 NOTE — ED AVS SNAPSHOT
[facility] Emergency Department    [address] [place] [address]    Phone:  [phone]    Fax:  [phone]                                       Harinder Rosario   MRN: [id number]    Department:  [facility] Emergency Department   Date of Visit:  8/30/2017           Patient Information     Date Of Birth          1960        Your diagnoses for this visit were:     Other acute gastritis        You were seen by Markus Brown MD.      Follow-up Information     Follow up with Lorin Ohara MD. Schedule an appointment as soon as possible for a visit in 1 day.    Specialties:  Internal Medicine, Pediatrics    Why:  As needed, If symptoms worsen    Contact information:    [facility] [place] [address]  [phone]          Discharge Instructions         Gastritis (Adult)    Gastritis is inflammation and irritation of the stomach lining. It can be present for a short time (acute) or be long lasting (chronic). Gastritis is often caused by infection with bacteria called H pylori. More than a third of people in the  have this bacteria in their bodies. In many cases, H pylori causes no problems or symptoms. In some people, though, the infection irritates the stomach lining and causes gastritis. Other causes of stomach irritation include drinking alcohol or taking pain-relieving medicines called NSAIDs (such as aspirin or ibuprofen).   Symptoms of gastritis can include:    Abdominal pain or bloating    Loss of appetite    Nausea or vomiting    Vomiting blood or having black stools    Feeling more tired than usual  An inflamed and irritated stomach lining is more likely to develop a sore called an ulcer. To help prevent this, gastritis should be treated.  Home care  If needed, medicines may be prescribed. If you have H pylori infection, treating it will likely relieve your symptoms. Other changes can help reduce stomach irritation and help it  heal.    If you have been prescribed medicines for H pylori infection, take them as directed. Take all of the medicine until it is finished or your healthcare provider tells you to stop, even if you feel better.    Your healthcare provider may recommend avoiding NSAIDs. If you take daily aspirin for your heart or other medical reasons, do not stop without talking to your healthcare provider first.    Avoid drinking alcohol.    Stop smoking. Smoking can irritate the stomach and delay healing. As much as possible, stay away from second hand smoke.  Follow-up care  Follow up with your healthcare provider, or as advised by our staff. Testing may be needed to check for inflammation or an ulcer.  When to seek medical advice  Call your healthcare provider for any of the following:    Stomach pain that gets worse or moves to the lower right abdomen (appendix area)    Chest pain that appears or gets worse, or spreads to the back, neck, shoulder, or arm    Frequent vomiting (can t keep down liquids)    Blood in the stool or vomit (red or black in color)    Feeling weak or dizzy    Fever of 100.4 F (38 C) or higher, or as directed by your healthcare provider  Date Last Reviewed: 6/22/2015 2000-2017 [employer]. [address] [place] [address]. All rights reserved. This information is not intended as a substitute for professional medical care. Always follow your healthcare professional's instructions.          24 Hour Appointment Hotline       To make an appointment at any [facility], call [phone] ([phone]). If you don't have a family doctor or clinic, we will help you find one. [facility] clinics are conveniently located to serve the needs of you and your family.             Review of your medicines      START taking        Dose / Directions Last dose taken    ondansetron 8 MG ODT tab   Commonly known as:  ZOFRAN ODT   Dose:  8 mg   Quantity:  10 tablet        Take 1 tablet (8 mg) by  mouth every 6 hours as needed   Refills:  0        sucralfate 1 GM/10ML suspension   Commonly known as:  CARAFATE   Dose:  1 g   Quantity:  140 mL        Take 10 mLs (1 g) by mouth 2 times daily for 7 days   Refills:  0          Our records show that you are taking the medicines listed below. If these are incorrect, please call your family doctor or clinic.        Dose / Directions Last dose taken    lisinopril 20 MG tablet   Commonly known as:  PRINIVIL/ZESTRIL   Dose:  20 mg   Quantity:  30 tablet        Take 1 tablet (20 mg) by mouth daily   Refills:  0                Prescriptions were sent or printed at these locations (2 Prescriptions)                   Other Prescriptions                Printed at Department/Unit printer (2 of 2)         ondansetron (ZOFRAN ODT) 8 MG ODT tab               sucralfate (CARAFATE) 1 GM/10ML suspension                Procedures and tests performed during your visit     CBC with platelets differential    Comprehensive metabolic panel    EKG 12 lead    Lipase    Troponin I    XR Chest 2 Views      Orders Needing Specimen Collection     None      Pending Results     Date and Time Order Name Status Description    8/30/2017 1439 EKG 12 lead Preliminary             Pending Culture Results     No orders found from 8/28/2017 to 8/31/2017.            Pending Results Instructions     If you had any lab results that were not finalized at the time of your Discharge, you can call the ED Lab Result RN at [phone]. You will be contacted by this team for any positive Lab results or changes in treatment. The nurses are available 7 days a week from 10A to 6:30P.  You can leave a message 24 hours per day and they will return your call.        Test Results From Your Hospital Stay        8/30/2017  3:04 PM      Component Results     Component Value Ref Range & Units Status    WBC 10.9 4.0 - 11.0 10e9/L Final    RBC Count 4.77 4.4 - 5.9 10e12/L Final    Hemoglobin 13.6 13.3 - 17.7 g/dL Final     Hematocrit 41.7 40.0 - 53.0 % Final    MCV 87 78 - 100 fl Final    MCH 28.5 26.5 - 33.0 pg Final    MCHC 32.6 31.5 - 36.5 g/dL Final    RDW 13.5 10.0 - 15.0 % Final    Platelet Count 271 150 - 450 10e9/L Final    Diff Method Automated Method  Final    % Neutrophils 70.1 % Final    % Lymphocytes 19.8 % Final    % Monocytes 7.7 % Final    % Eosinophils 1.0 % Final    % Basophils 0.5 % Final    % Immature Granulocytes 0.9 % Final    Nucleated RBCs 0 0 /100 Final    Absolute Neutrophil 7.6 1.6 - 8.3 10e9/L Final    Absolute Lymphocytes 2.2 0.8 - 5.3 10e9/L Final    Absolute Monocytes 0.8 0.0 - 1.3 10e9/L Final    Absolute Eosinophils 0.1 0.0 - 0.7 10e9/L Final    Absolute Basophils 0.1 0.0 - 0.2 10e9/L Final    Abs Immature Granulocytes 0.1 0 - 0.4 10e9/L Final    Absolute Nucleated RBC 0.0  Final         8/30/2017  3:24 PM      Component Results     Component Value Ref Range & Units Status    Sodium 141 133 - 144 mmol/L Final    Potassium 3.9 3.4 - 5.3 mmol/L Final    Chloride 107 94 - 109 mmol/L Final    Carbon Dioxide 27 20 - 32 mmol/L Final    Anion Gap 7 3 - 14 mmol/L Final    Glucose 102 (H) 70 - 99 mg/dL Final    Urea Nitrogen 16 7 - 30 mg/dL Final    Creatinine 1.25 0.66 - 1.25 mg/dL Final    GFR Estimate 60 (L) >60 mL/min/1.7m2 Final    Non  GFR Calc    GFR Estimate If Black 73 >60 mL/min/1.7m2 Final    African American GFR Calc    Calcium 8.7 8.5 - 10.1 mg/dL Final    Bilirubin Total 0.2 0.2 - 1.3 mg/dL Final    Albumin 3.4 3.4 - 5.0 g/dL Final    Protein Total 7.1 6.8 - 8.8 g/dL Final    Alkaline Phosphatase 87 40 - 150 U/L Final    ALT 34 0 - 70 U/L Final    AST 16 0 - 45 U/L Final         8/30/2017  3:24 PM      Component Results     Component Value Ref Range & Units Status    Lipase 175 73 - 393 U/L Final         8/30/2017  3:24 PM      Component Results     Component Value Ref Range & Units Status    Troponin I ES <0.015 0.000 - 0.045 ug/L Final    The 99th percentile for upper reference  range is 0.045 ug/L.  Troponin values   in the range of 0.045 - 0.120 ug/L may be associated with risks of adverse   clinical events.           8/30/2017  3:33 PM      Narrative     XR CHEST 2 VW 8/30/2017 3:29 PM    HISTORY: Nausea and vomiting.    COMPARISON: None.    FINDINGS: No airspace consolidation, pleural effusion or pneumothorax.  Normal heart size.        Impression     IMPRESSION: No acute cardiopulmonary abnormality.    JAYME LAIRD MD                Clinical Quality Measure: Blood Pressure Screening     Your blood pressure was checked while you were in the emergency department today. The last reading we obtained was  BP: 133/78 . Please read the guidelines below about what these numbers mean and what you should do about them.  If your systolic blood pressure (the top number) is less than 120 and your diastolic blood pressure (the bottom number) is less than 80, then your blood pressure is normal. There is nothing more that you need to do about it.  If your systolic blood pressure (the top number) is 120-139 or your diastolic blood pressure (the bottom number) is 80-89, your blood pressure may be higher than it should be. You should have your blood pressure rechecked within a year by a primary care provider.  If your systolic blood pressure (the top number) is 140 or greater or your diastolic blood pressure (the bottom number) is 90 or greater, you may have high blood pressure. High blood pressure is treatable, but if left untreated over time it can put you at risk for heart attack, stroke, or kidney failure. You should have your blood pressure rechecked by a primary care provider within the next 4 weeks.  If your provider in the emergency department today gave you specific instructions to follow-up with your doctor or provider even sooner than that, you should follow that instruction and not wait for up to 4 weeks for your follow-up visit.        Thank you for choosing [facility]       Thank you for  "choosing [facility] for your care. Our goal is always to provide you with excellent care. Hearing back from our patients is one way we can continue to improve our services. Please take a few minutes to complete the written survey that you may receive in the mail after you visit with us. Thank you!        e|tabharUser Replay Information     [employer] lets you send messages to your doctor, view your test results, renew your prescriptions, schedule appointments and more. To sign up, go to [email or web address] . Click on \"Log in\" on the left side of the screen, which will take you to the Welcome page. Then click on \"Sign up Now\" on the right side of the page.     You will be asked to enter the access code listed below, as well as some personal information. Please follow the directions to create your username and password.     Your access code is: [email or web address]  Expires: 2017  7:48 PM     Your access code will  in 90 days. If you need help or a new code, please call your [facility] clinic or [phone].        Care EveryWhere ID     This is your Care EveryWhere ID. This could be used by other organizations to access your [facility] medical records  [id number]        Equal Access to Services     JORDAN MCALLISTER : Seb Pearce, rose mary barbosa, jeni dowling, arabella gutiérrez. So [facility] [phone].    ATENCIÓN: Si habla español, tiene a marshall disposición servicios gratuitos de asistencia lingüística. Llame al [phone].    We comply with applicable federal civil rights laws and [place] laws. We do not discriminate on the basis of race, color, national origin, age, disability sex, sexual orientation or gender identity.            After Visit Summary       This is your record. Keep this with you and show to your community pharmacist(s) and doctor(s) at your next visit.                  "

## 2017-08-30 NOTE — MR AVS SNAPSHOT
"              After Visit Summary   8/30/2017    Harinder Rosario    MRN: [id number]           Patient Information     Date Of Birth          1960        Visit Information        Provider Department      8/30/2017 1:10 PM Anthony Sanches MD [facility]        Today's Diagnoses     Intractable vomiting with nausea, unspecified vomiting type    -  1       Follow-ups after your visit        Your next 10 appointments already scheduled     Sep 18, 2017   Procedure with Artemio Ojeda MD   [facility] ([facility])    [address] [place] [id number] [phone]           [facility] is located at [address] [place]              Who to contact     If you have questions or need follow up information about today's clinic visit or your schedule please contact [facility] directly at [phone].  Normal or non-critical lab and imaging results will be communicated to you by MyChart, letter or phone within 4 business days after the clinic has received the results. If you do not hear from us within 7 days, please contact the clinic through MyChart or phone. If you have a critical or abnormal lab result, we will notify you by phone as soon as possible.  Submit refill requests through [employer] or call your pharmacy and they will forward the refill request to us. Please allow 3 business days for your refill to be completed.          Additional Information About Your Visit        MyChart Information     [employer] lets you send messages to your doctor, view your test results, renew your prescriptions, schedule appointments and more. To sign up, go to [email or web address] . Click on \"Log in\" on the left side of the screen, which will take you to the Welcome page. Then click on \"Sign up Now\" on the right side of the page.     You will be asked to enter the access code listed below, as well as some personal information. Please " follow the directions to create your username and password.     Your access code is: [email or web address]  Expires: 2017  7:48 PM     Your access code will  in 90 days. If you need help or a new code, please call your [place] clinic or [phone].        Care EveryWhere ID     This is your Care EveryWhere ID. This could be used by other organizations to access your [place] medical records  [id number]        Your Vitals Were     Pulse Temperature Pulse Oximetry BMI (Body Mass Index)          80 98.7  F (37.1  C) (Tympanic) 95% 44.08 kg/m2         Blood Pressure from Last 3 Encounters:   17 130/80   17 130/78   17 101/55    Weight from Last 3 Encounters:   17 (!) 325 lb (147.4 kg)   17 (!) 325 lb 6.4 oz (147.6 kg)   17 (!) 323 lb 1.6 oz (146.6 kg)              Today, you had the following     No orders found for display       Primary Care Provider Office Phone # Fax #    Lorin Ohara [phone] [address] [place] [address]        Equal Access to Services     [facility]: Hadii mark ku hadasho Soomaali, waaxda luqadaha, qaybta kaalmada adeegyada, arabella gutiérrez. So [facility] [phone].    ATENCIÓN: Si habla español, tiene a marshall disposición servicios gratuitos de asistencia lingüística. Llame al [phone].    We comply with applicable federal civil rights laws and [place] laws. We do not discriminate on the basis of race, color, national origin, age, disability sex, sexual orientation or gender identity.            Thank you!     Thank you for choosing [facility]  for your care. Our goal is always to provide you with excellent care. Hearing back from our patients is one way we can continue to improve our services. Please take a few minutes to complete the written survey that you may receive in the mail after your visit with us. Thank you!             Your Updated Medication List -  Protect others around you: Learn how to safely use, store and throw away your medicines at www.disposemymeds.org.          This list is accurate as of: 8/30/17  1:53 PM.  Always use your most recent med list.                   Brand Name Dispense Instructions for use Diagnosis    lisinopril 20 MG tablet    PRINIVIL/ZESTRIL    30 tablet    Take 1 tablet (20 mg) by mouth daily

## 2017-08-30 NOTE — ED NOTES
Pt discharged at this time; instructions understood, prescriptions given. Pt is awake, alert, and oriented x4; pt is ambulatory with a steady gait.

## 2017-08-30 NOTE — ED PROVIDER NOTES
History     Chief Complaint:  Nausea & Vomiting    HPI   Harinder Rosario is a 56 year old male who presents with nausea and vomiting. The patient was just released from the hospital 5 days ago on 17 after having a gastrointestinal virus. He states that he was doing well at the time of discharge, but notes that he started feeling nauseous and gagged a couple times on Saturday and . He had slight abdominal pain here in the ED. He began throwing up today, stating that it is just phlegm and no blood present in the emesis. He additionally reports having heartburn and the hiccups. As of late, his urine has been light yellow in color and he had a bowel movement earlier today.    Allergies:  NKDA    Medications:    Prinivil    Past Medical History:    HTN  BAMBI  Basal cell carcinoma  Obesity    Past Surgical History:    Arthroscopy knee    Family History:    Mother: Asthma ( of respiratory arrest)  Father: Arrhythmia    Social History:  Never smoker  Occasional alcohol use  Marital Status:  Single      Review of Systems   Constitutional: Negative for fever.   Respiratory: Negative for cough.    Gastrointestinal: Positive for abdominal pain, nausea and vomiting.   All other systems reviewed and are negative.      Physical Exam   First Vitals:  /78 (BP Location: Left arm)  Pulse 68  Temp 99.2  F (37.3  C) (Oral)  Resp 16  Ht 1.829 m (6')  Wt (!) 145.2 kg (320 lb)  SpO2 98%  BMI 43.4 kg/m2       Physical Exam  Constitutional: Patient is well appearing. No distress.  Head: Atraumatic.  Mouth/Throat: Oropharynx is clear and moist. No oropharyngeal exudate.  Eyes: Conjunctivae and EOM are normal. No scleral icterus.  Neck: Normal range of motion. Neck supple.   Cardiovascular: Normal rate, regular rhythm, normal heart sounds and intact distal pulses.   Pulmonary/Chest: Breath sounds normal. No respiratory distress.  Abdominal: Soft. Bowel sounds are normal. No distension. No tenderness. No rebound  or guarding.   Musculoskeletal: Normal range of motion. No edema or tenderness.   Neurological: Alert and orientated to person, place, and time. No observable focal neuro deficit  Skin: Warm and dry. No rash noted. Not diaphoretic.       Emergency Department Course     Imaging:  XR Chest 2 Views  No acute cardiopulmonary abnormality.  As per radiology.    Laboratory:  CBC: WBC: 10.9, HGB: 13.6, PLT: 271  CMP: Glucose 102 (H), GFR 60 (L), o/w WNL (Creatinine: 1.25)    Lipase: 175  Troponin: <0.015    Interventions:  14:55 0.9% Sodium Chloride 1,000 mL IV  14:56 Zofran 4 mg IV  16:05 Xylocaine 30 mL GI Cocktail PO    Emergency Department Course:  Nursing notes and vitals reviewed.  I performed an exam of the patient as documented above.     The patient was sent for a Chest Xray while in the emergency department, findings above.   Blood drawn. This was sent to the lab for further testing, results above.    IV inserted. Medicine administered as documented above.   A GI cocktail was given to the patient as documented above.    I personally reviewed the laboratory results with the Patient and answered all related questions prior to discharge.   Findings and plan explained to the Patient. Patient discharged home with instructions regarding supportive care, medications, and reasons to return. The importance of close follow-up was reviewed. The patient was prescribed Zofran and Carafate.        Impression & Plan      Medical Decision Making:  Harinder Rosario is a 56 year old malept feels great here and sx free and wants to go home.  Screening comparative workup improved and pt understands.       Diagnosis:    ICD-10-CM    1. Other acute gastritis K29.00        Disposition:  discharged to home    Discharge Medications:  Discharge Medication List as of 8/30/2017  4:07 PM      START taking these medications    Details   ondansetron (ZOFRAN ODT) 8 MG ODT tab Take 1 tablet (8 mg) by mouth every 6 hours as needed, Disp-10  tablet, R-0, Local Print      sucralfate (CARAFATE) 1 GM/10ML suspension Take 10 mLs (1 g) by mouth 2 times daily for 7 days, Disp-140 mL, R-0, Local Print             IAmerico, am serving as a scribe on 8/30/2017 at 2:50 PM to personally document services performed by Markus Brown MD based on my observations and the provider's statements to me.     8/30/2017   [facility] EMERGENCY DEPARTMENT       Markus Brown MD  08/30/17 [id number]

## 2017-08-31 LAB — INTERPRETATION ECG - MUSE: NORMAL

## 2017-09-18 ENCOUNTER — HOSPITAL ENCOUNTER (OUTPATIENT)
Facility: CLINIC | Age: 57
Discharge: HOME | End: 2017-09-18
Attending: INTERNAL MEDICINE | Admitting: INTERNAL MEDICINE
Payer: COMMERCIAL

## 2017-09-18 VITALS
HEIGHT: 72 IN | SYSTOLIC BLOOD PRESSURE: 122 MMHG | BODY MASS INDEX: 42.66 KG/M2 | WEIGHT: 315 LBS | RESPIRATION RATE: 16 BRPM | DIASTOLIC BLOOD PRESSURE: 69 MMHG | OXYGEN SATURATION: 97 %

## 2017-09-18 LAB — COLONOSCOPY: NORMAL

## 2017-09-18 PROCEDURE — 25000128 H RX IP 250 OP 636: Performed by: INTERNAL MEDICINE

## 2017-09-18 PROCEDURE — G0121 COLON CA SCRN NOT HI RSK IND: HCPCS | Performed by: INTERNAL MEDICINE

## 2017-09-18 PROCEDURE — G0500 MOD SEDAT ENDO SERVICE >5YRS: HCPCS | Performed by: INTERNAL MEDICINE

## 2017-09-18 PROCEDURE — 45378 DIAGNOSTIC COLONOSCOPY: CPT | Performed by: INTERNAL MEDICINE

## 2017-09-18 RX ORDER — ONDANSETRON 2 MG/ML
4 INJECTION INTRAMUSCULAR; INTRAVENOUS
Status: DISCONTINUED | OUTPATIENT
Start: 2017-09-18 | End: 2017-09-18 | Stop reason: HOSPADM

## 2017-09-18 RX ORDER — ONDANSETRON 4 MG/1
4 TABLET, ORALLY DISINTEGRATING ORAL EVERY 6 HOURS PRN
Status: DISCONTINUED | OUTPATIENT
Start: 2017-09-18 | End: 2017-09-18 | Stop reason: HOSPADM

## 2017-09-18 RX ORDER — ONDANSETRON 2 MG/ML
4 INJECTION INTRAMUSCULAR; INTRAVENOUS EVERY 6 HOURS PRN
Status: DISCONTINUED | OUTPATIENT
Start: 2017-09-18 | End: 2017-09-18 | Stop reason: HOSPADM

## 2017-09-18 RX ORDER — LIDOCAINE 40 MG/G
CREAM TOPICAL
Status: DISCONTINUED | OUTPATIENT
Start: 2017-09-18 | End: 2017-09-18 | Stop reason: HOSPADM

## 2017-09-18 RX ORDER — FLUMAZENIL 0.1 MG/ML
0.2 INJECTION, SOLUTION INTRAVENOUS
Status: DISCONTINUED | OUTPATIENT
Start: 2017-09-18 | End: 2017-09-18 | Stop reason: HOSPADM

## 2017-09-18 RX ORDER — FENTANYL CITRATE 50 UG/ML
INJECTION, SOLUTION INTRAMUSCULAR; INTRAVENOUS PRN
Status: DISCONTINUED | OUTPATIENT
Start: 2017-09-18 | End: 2017-09-18 | Stop reason: HOSPADM

## 2017-09-18 RX ORDER — NALOXONE HYDROCHLORIDE 0.4 MG/ML
.1-.4 INJECTION, SOLUTION INTRAMUSCULAR; INTRAVENOUS; SUBCUTANEOUS
Status: DISCONTINUED | OUTPATIENT
Start: 2017-09-18 | End: 2017-09-18 | Stop reason: HOSPADM

## 2017-09-18 NOTE — LETTER
Harinder Metz Higden  [address] [place] [address]            Thank you for choosing [facility]. You are scheduled for the following service.   Date:  9/18/2017 Monday       Procedure: COLONOSCOPY  Doctor:   Dr. Artemio Ojeda         Arrival Time:  1:30 PM   *check in at Emergency/Endoscopy desk*  Procedure Time:  2:00 PM    Location:   [facility]        Endoscopy Department, First Floor (Enter through ER Doors) *         [address] [place], [place] [address]      [phone] or [phone] ([facility]) to reschedule    NuLYTELY  PREP  Colonoscopy is the most accurate test to detect colon polyps and colon cancer; and the only test where polyps can be removed. During this procedure, a doctor examines the lining of your large intestine and rectum through a flexible tube.     Transportation  Arrange for a ride for the day of your procedures with a responsible adult.  A taxi ride is not an option unless you are accompanied by a responsible adult. If you fail to arrange transportation with a responsible adult, your procedure will be cancelled and rescheduled.    Fill your prescription for NuLYTELY  at your local pharmacy. Please call our office at [phone] if you did not receive a prescription.      PREPARATION FOR COLONOSCOPY    7 days before:    Discontinue fiber supplements and medications containing iron. This includes Metamucil  and Fibercon ; and multivitamins with iron.  3 days before:    Begin a low-fiber diet. A low-fiber diet helps making the cleanout more effective. For additional details on low-fiber diet, please refer to the table on the last page.  2 days before:    Continue the low-fiber diet.     Drink at least 8 glasses of water throughout the day.     Stop eating solid foods at 11:45 pm.  1 day before:    In the morning: begin a clear liquid diet (liquids you can see through).     Examples of a clear liquid diet include: water, clear  broth or bouillon, Gatorade, Pedialyte or Powerade, carbonated and non-carbonated soft drinks (Sprite , 7-Up , ginger ale), strained fruit juices without pulp (apple, white grape, white cranberry), Jell-O  and popsicles.     The following are not allowed on a clear liquid diet: red liquids, alcoholic beverages, coffee, dairy products (milk, creamer, and yogurt), protein shakes, creamy broths, juice with pulp and chewing tobacco.    At 6pm: drink 1 (one) 8 oz glass of NuLYTELY  solution every 15 minutes until half the bottle (approximately 8 glasses of 8 oz) is gone. Keep the solution refrigerated. Do not drink any other liquids while you are drinking the NuLYTELY  solution.    Over the course of the evening, drink an additional   liter of clear liquids and continue clear liquid diet.    COLON CLEANSING TIPS: drink adequate amounts of fluids before and after your colon cleansing to prevent dehydration. Stay near a toilet because you will have diarrhea. Even if you are sitting on the toilet, continue to drink the cleansing solution every 15 minutes. If you feel nauseous or vomit, rinse your mouth with water, take a 15 to 30-minute-break and then continue drinking the solution. You will be uncomfortable until the stool has flushed from your colon (in about 2 to 4 hours). You may feel chilled.    DAY OF YOUR PROCEDURE  You may take all of your morning medications including blood pressure medications, blood thinners (if you have not been instructed to stop these by our office), methadone, and anti-seizure medications with sips of water 3 hours prior to your procedure or earlier. Do not take insulin or vitamins prior to your procedure. Continue the clear liquid diet.    6 hours prior: drink 1 (one) 8 oz glass of NuLYTELY  solution every 15 minutes until the remaining solution (approximately 8 glasses of 8 oz) is gone. Keep the solution refrigerated.      4 hours prior:   o STOP consuming all liquids   o Do not take  anything by mouth during this time.   o Allow extra time to travel to your procedure as you may need to stop and use a restroom along the way.  You are ready for the procedure, if you followed all instructions and your stool is no longer formed, but clear or yellow liquid. If you are unsure whether your colon is clean, please call our office at [phone] before you leave for your appointment.    Bring the following to your procedure:  - Insurance Card/Photo ID.   - List of current medications including over-the-counter medications and supplements.   - Your rescue inhaler if you currently use one to control asthma.    Canceling or rescheduling your appointment:   If you must cancel or reschedule your appointment, please call [phone] as soon as possible.    COLONOSCOPY PRE-PROCEDURE CHECKLIST  If you have diabetes, ask your regular doctor for diet and medication restrictions.  If you take an anticoagulant or anti-platelet medication (such as Coumadin , Lovenox , Pradaxa , Xarelto , Eliquis , etc.), please call your primary doctor for advice on holding this medication.  If you take aspirin you may continue to do so.  If you are or may be pregnant, please discuss the risks and benefits of this procedure with your doctor.    What happens during a colonoscopy?    Plan to spend up to two hours, starting at registration time, at the endoscopy center the day of your procedure. The colonoscopy takes an average of 15 to 30 minutes. Recovery time is about 30 minutes.    Before the exam:    You will change into a gown.    Your medical history and medication list will be reviewed with you, unless that has been done over the phone prior to the procedure.     A nurse will insert an intravenous (IV) line into your hand or arm.    The doctor will meet with you and will give you a consent form to sign.    During the exam:     Medicine will be given through the IV line to help you relax.     Your heart rate and oxygen levels  will be monitored. If your blood pressure is low, you may be given fluids through the IV line.     The doctor will insert a flexible hollow tube, called a colonoscope, into your rectum. The scope will be advanced slowly through the large intestine (colon).    You may have a feeling of fullness or pressure.     If an abnormal tissue or a polyp is found, the doctor may remove it through the endoscope for closer examination, or biopsy. Tissue removal is painless.    After the exam:           Any tissue samples removed during the exam will be sent to a lab for evaluation. It may take 5-7 working days for you to be notified of the results.     A nurse will provide you with complete discharge instructions before you leave the endoscopy center. Be sure to ask the nurse for specific instructions if you take blood thinners such as Aspirin, Coumadin or Plavix.     The doctor will prepare a full report for you and for the physician who referred you for the procedure.     Your doctor will talk with you about the initial results of your exam.      Medication given during the exam will prohibit you from driving for the rest of the day.     Following the exam, you may resume your normal diet. Your first meal should be light, no greasy foods. Avoid alcohol until the next day.     You may resume your regular activities the day after the procedure.     LOW-FIBER DIET  Foods RECOMMENDED Foods to AVOID   Breads, Cereal, Rice and Pasta:   White bread, rolls, biscuits, croissant and renetta toast.   Waffles, [place] toast and pancakes.   White rice, noodles, pasta, macaroni and peeled cooked potatoes.   Plain crackers and saltines.   Cooked cereals: farina, cream of rice.   Cold cereals: Puffed Rice , Rice Krispies , Corn Flakes  and Special K    Breads, Cereal, Rice and Pasta:   Breads or rolls with nuts, seeds or fruit.   Whole wheat, pumpernickel, rye breads and cornbread.   Potatoes with skin, brown or wild rice, and kasha  (buckwheat).     Vegetables:   Tender cooked and canned vegetables without seeds: carrots, asparagus tips, green or wax beans, pumpkin, spinach, lima beans. Vegetables:   Raw or steamed vegetables.   Vegetables with seeds.   Sauerkraut.   Winter squash, peas, broccoli, Brussel sprouts, cabbage, onions, cauliflower, baked beans, peas and corn.   Fruits:   Strained fruit juice.   Canned fruit, except pineapple.   Ripe bananas and melon. Fruits:   Prunes and prune juice.   Raw fruits.   Dried fruits: figs, dates and raisins.   Milk/Dairy:   Milk: plain or flavored.   Yogurt, custard and ice cream.   Cheese and cottage cheese Milk/Dairy:     Meat and other proteins:   ground, well-cooked tender beef, lamb, ham, veal, pork, fish, poultry and organ meats.   Eggs.   Peanut butter without nuts. Meat and other proteins:   Tough, fibrous meats with gristle.   Dry beans, peas and lentils.   Peanut butter with nuts.   Tofu.   Fats, Snack, Sweets, Condiments and Beverages:   Margarine, butter, oils, mayonnaise, sour cream and salad dressing, plain gravy.   Sugar, hard candy, clear jelly, honey and syrup.   Spices, cooked herbs, bouillon, broth and soups made with allowed vegetable, ketchup and mustard.   Coffee, tea and carbonated drinks.   Plain cakes, cookies and pretzels.   Gelatin, plain puddings, custard, ice cream, sherbet and popsicles. Fats, Snack, Sweets, Condiments and Beverages:   Nuts, seeds and coconut.   Jam, marmalade and preserves.   Pickles, olives, relish and horseradish.   All desserts containing nuts, seeds, dried fruit and coconut; or made from whole grains or bran.   Candy made with nuts or seeds.   Popcorn.       DIRECTIONS TO THE ENDOSCOPY DEPARTMENT    From the north ([facility])  Take [address] South, exit on [address] . Get into the left hand eduard, turn left (east), go one-half mile to [address] and turn left. Go north to the first stoplight, take a right on [address]  and follow it to the Emergency entrance.    From the south ([facility])  Take [address]N to the [address]E split and exit on [address] . On [address] , turn left (west) to [address]. Turn right (north) on [address]. Go north to the first stoplight, take a right on [address] and follow it to the Emergency entrance.    From the east via 35E ([facility])  Take [address] south to [place] [address] exit. Turn right on [address] . Go west to [address]. Turn right (north) on [address]. Go to the first stoplight, take a right and follow on [address] to the Emergency entrance.    From the east via [address] ([facility])  Take [address] to [address]. Turn left (south) on [address] to [address]. Turn left (east) on [address] and follow it to the Emergency entrance.    From the west via [address] ([place], [place])  Take [address] east to [address]. Turn right (south) on [address] to [address]. Turn left (east) on [address] and follow it to the Emergency entrance.

## 2017-09-18 NOTE — H&P
Pre-Endoscopy History and Physical     Harinder Rosario MRN# [id number]   YOB: 1960 Age: 56 year old     Date of Procedure: 9/18/2017  Primary care provider: Lorin Ohara  Type of Endoscopy: Colonoscopy with possible biopsy, possible polypectomy  Reason for Procedure: screen  Type of Anesthesia Anticipated: Conscious Sedation    HPI:    Harinder is a 56 year old male who will be undergoing the above procedure.      A history and physical has been performed. The patient's medications and allergies have been reviewed. The risks and benefits of the procedure and the sedation options and risks were discussed with the patient.  All questions were answered and informed consent was obtained.      He denies a personal or family history of anesthesia complications or bleeding disorders.     Patient Active Problem List   Diagnosis     BAMBI (acute kidney injury) (H)     Benign essential hypertension     History of basal cell carcinoma     Obesity (H)        Past Medical History:   Diagnosis Date     Hypertension         Past Surgical History:   Procedure Laterality Date     ARTHROSCOPY KNEE Right      HEAD & NECK SURGERY      basil cell on cheek       Social History   Substance Use Topics     Smoking status: Never Smoker     Smokeless tobacco: Never Used     Alcohol use Yes      Comment: occassional       Family History   Problem Relation Age of Onset     Asthma Mother      dies of respiratory arrest     Arrhythmia Father      dies at 90     Multiple Sclerosis Brother        Prior to Admission medications    Medication Sig Start Date End Date Taking? Authorizing Provider   lisinopril (PRINIVIL/ZESTRIL) 20 MG tablet Take 1 tablet (20 mg) by mouth daily 8/30/17  Yes Lorin Ohara MD       No Known Allergies     REVIEW OF SYSTEMS:   5 point ROS negative except as noted above in HPI, including Gen., Resp., CV, GI &  system review.    PHYSICAL EXAM:   There were no vitals taken for this visit.  Estimated body mass index is 43.4 kg/(m^2) as calculated from the following:    Height as of 8/30/17: 1.829 m (6').    Weight as of 8/30/17: 145.2 kg (320 lb).   GENERAL APPEARANCE: alert, and oriented  MENTAL STATUS: alert  AIRWAY EXAM: Mallampatti Class I (visualization of the soft palate, fauces, uvula, anterior and posterior pillars)  RESP: lungs clear to auscultation - no rales, rhonchi or wheezes  CV: regular rates and rhythm  DIAGNOSTICS:    Not indicated    IMPRESSION   ASA Class 1 - Healthy patient, no medical problems    PLAN:   Plan for Colonoscopy with possible biopsy, possible polypectomy. We discussed the risks, benefits and alternatives and the patient wished to proceed.    The above has been forwarded to the consulting provider.      Signed Electronically by: Artemio Ojeda  September 18, 2017

## (undated) DEVICE — KIT ENDO TURNOVER/PROCEDURE W/CLEAN A SCOPE LINERS 103888

## (undated) RX ORDER — FENTANYL CITRATE 50 UG/ML
INJECTION, SOLUTION INTRAMUSCULAR; INTRAVENOUS
Status: DISPENSED
Start: 2017-09-18